# Patient Record
Sex: MALE | Employment: OTHER | URBAN - METROPOLITAN AREA
[De-identification: names, ages, dates, MRNs, and addresses within clinical notes are randomized per-mention and may not be internally consistent; named-entity substitution may affect disease eponyms.]

---

## 2020-09-29 ENCOUNTER — TELEPHONE (OUTPATIENT)
Dept: TRANSPLANT | Facility: CLINIC | Age: 55
End: 2020-09-29

## 2020-09-29 NOTE — TELEPHONE ENCOUNTER
----- Message from Blanca Merritt sent at 9/29/2020  9:54 AM CDT -----    Pending insur cards from ref Md office.   .  ----- Message -----  From: Adele Moreland  Sent: 9/28/2020   5:08 PM CDT  To: [facility] Referral Pool    Good afternoon,    The pt listed above is being referred from Dr. Declan Aldana for liver cancer. I have scanned the referral and records into . Waiting on insurance card  and liver transplant referral form from referring providers office. Please contact pt to schedule appt at your earliest convenience.          Thank You,  Adele Moore

## 2020-09-30 ENCOUNTER — TELEPHONE (OUTPATIENT)
Dept: TRANSPLANT | Facility: CLINIC | Age: 55
End: 2020-09-30

## 2020-09-30 NOTE — TELEPHONE ENCOUNTER
----- Message from Blanca Merritt sent at 9/30/2020 10:50 AM CDT -----    Called ref Md office at [phone] and was able to obtain pt insur info. Pt will also need his CD.  .  ----- Message -----  From: Blanca Merritt  Sent: 9/29/2020   9:54 AM CDT  To: Blanca Merritt      Pending insur cards from ref Md office.   .  ----- Message -----  From: Adele Moreland  Sent: 9/28/2020   5:08 PM CDT  To: [facility] Referral Pool    Good afternoon,    The pt listed above is being referred from Dr. Declan Aldana for liver cancer. I have scanned the referral and records into . Waiting on insurance card  and liver transplant referral form from referring providers office. Please contact pt to schedule appt at your earliest convenience.          Thank You,  Adele Moore

## 2020-10-06 ENCOUNTER — TELEPHONE (OUTPATIENT)
Dept: TRANSPLANT | Facility: CLINIC | Age: 55
End: 2020-10-06

## 2020-10-06 NOTE — TELEPHONE ENCOUNTER
Referral received from Declan Aldana     Patient with cirrhosis with liver mass. MELD 19  ICD-10:  k74.60  Referred for liver transplant for  EVALUATION.    Referral completed and forwarded to [facility].    Requested CD from referring.        Insurance:

## 2020-10-07 ENCOUNTER — TELEPHONE (OUTPATIENT)
Dept: TRANSPLANT | Facility: CLINIC | Age: 55
End: 2020-10-07

## 2020-10-07 NOTE — TELEPHONE ENCOUNTER
Pt called re appt and need for CD's. I spoke to the patient who put me on the phone with his wife and eventually his sister. I discussed his diagnosis of cirrhosis and liver cancer and the need for a liver transplant and treatment. The pt does not want to come to [place] as it is too far.  I gave him the options of [place] and [place]. I discussed with the pt and family that without treatment or a liver transplant he will get sicker and die of liver disease. I encourage them to consider coming to [place]/[facility] as we do not have a sobriety rule. Pt stated he stopped drinking but was told he would not get a transplant unless he was sober for 1 year. I informed him this was not true. SPoke about the evaluation and need to come. He again stated he does not want to come to [place]. Referring Dr BRYAN VEE called, spoke with his nurse, she will give him the information.

## 2023-10-25 ENCOUNTER — TELEPHONE (OUTPATIENT)
Dept: PRIMARY CARE CLINIC | Facility: CLINIC | Age: 58
End: 2023-10-25
Payer: MEDICARE

## 2023-10-25 NOTE — TELEPHONE ENCOUNTER
He used to an oncologist at [facility]; Dr Newton and the cardiologist is Dr Bhandari and Dr Kasper for pulmonology.     ----- Message from Viviana Cruz MD sent at 10/25/2023  5:22 AM CDT -----  Regarding: RE: New Patient  Contact: Patient  I need a bit more information, does he have an oncologist? What other providers is he seeing? Does he have a hepatologist? What is the reason to establish      ----- Message -----  From: Lala George MA  Sent: 10/20/2023   3:03 PM CDT  To: Viviana Cruz MD  Subject: FW: New Patient                                    ----- Message -----  From: Nneka Hamilton  Sent: 10/20/2023   2:48 PM CDT  To: Anthony Michelle Staff  Subject: New Patient                                      Per phone call with patient, he stated that he wanted to get establish with his new primary doctor and would like to schedule an appointment to see the physician.  Please return call at [phone].    Thanks,  SJ

## 2024-06-14 ENCOUNTER — TELEPHONE (OUTPATIENT)
Dept: HEMATOLOGY/ONCOLOGY | Facility: CLINIC | Age: 59
End: 2024-06-14
Payer: MEDICARE

## 2024-06-14 NOTE — TELEPHONE ENCOUNTER
Spoke to the patient. Appointment date, time, and location provided. Direct number given. All questions answered. TTRN

## 2024-06-24 ENCOUNTER — TELEPHONE (OUTPATIENT)
Dept: HEMATOLOGY/ONCOLOGY | Facility: CLINIC | Age: 59
End: 2024-06-24

## 2024-06-24 NOTE — TELEPHONE ENCOUNTER
Patient called to cancel appointment due to family emergency. Appointment rescheduled per patient request. TTRN

## 2024-07-02 ENCOUNTER — TELEPHONE (OUTPATIENT)
Dept: HEMATOLOGY/ONCOLOGY | Facility: CLINIC | Age: 59
End: 2024-07-02
Payer: MEDICARE

## 2024-07-17 ENCOUNTER — TELEPHONE (OUTPATIENT)
Dept: HEMATOLOGY/ONCOLOGY | Facility: CLINIC | Age: 59
End: 2024-07-17
Payer: MEDICARE

## 2024-07-30 ENCOUNTER — TELEPHONE (OUTPATIENT)
Dept: HEMATOLOGY/ONCOLOGY | Facility: CLINIC | Age: 59
End: 2024-07-30
Payer: MEDICARE

## 2024-07-30 NOTE — TELEPHONE ENCOUNTER
Spoke with the patients sister Kamilla appointment date, time, and location provided. All questions answered. TTRN

## 2024-08-13 ENCOUNTER — OFFICE VISIT (OUTPATIENT)
Dept: HEMATOLOGY/ONCOLOGY | Facility: CLINIC | Age: 59
End: 2024-08-13
Payer: MEDICARE

## 2024-08-13 VITALS
OXYGEN SATURATION: 97 % | DIASTOLIC BLOOD PRESSURE: 60 MMHG | HEART RATE: 85 BPM | SYSTOLIC BLOOD PRESSURE: 112 MMHG | HEIGHT: 73 IN | RESPIRATION RATE: 16 BRPM | BODY MASS INDEX: 15.15 KG/M2 | WEIGHT: 114.31 LBS

## 2024-08-13 DIAGNOSIS — C13.9 HYPOPHARYNGEAL CANCER: Primary | ICD-10-CM

## 2024-08-13 PROCEDURE — G0179 MD RECERTIFICATION HHA PT: HCPCS | Mod: ,,, | Performed by: INTERNAL MEDICINE

## 2024-08-13 RX ORDER — CEFDINIR 250 MG/5ML
250 POWDER, FOR SUSPENSION ORAL DAILY
COMMUNITY

## 2024-08-13 RX ORDER — FAMOTIDINE 20 MG/1
20 TABLET, FILM COATED ORAL 2 TIMES DAILY
COMMUNITY

## 2024-08-14 ENCOUNTER — OFFICE VISIT (OUTPATIENT)
Dept: PRIMARY CARE CLINIC | Facility: CLINIC | Age: 59
End: 2024-08-14
Payer: MEDICARE

## 2024-08-14 VITALS
DIASTOLIC BLOOD PRESSURE: 74 MMHG | TEMPERATURE: 98 F | HEIGHT: 73 IN | SYSTOLIC BLOOD PRESSURE: 138 MMHG | OXYGEN SATURATION: 97 % | RESPIRATION RATE: 16 BRPM | HEART RATE: 69 BPM | BODY MASS INDEX: 14.69 KG/M2 | WEIGHT: 110.88 LBS

## 2024-08-14 DIAGNOSIS — E43 SEVERE PROTEIN-CALORIE MALNUTRITION: ICD-10-CM

## 2024-08-14 DIAGNOSIS — I10 ESSENTIAL (PRIMARY) HYPERTENSION: ICD-10-CM

## 2024-08-14 DIAGNOSIS — C32.9 LARYNGEAL CANCER: ICD-10-CM

## 2024-08-14 DIAGNOSIS — G40.909 SEIZURE DISORDER: Primary | ICD-10-CM

## 2024-08-14 DIAGNOSIS — C22.0 HEPATOCELLULAR CARCINOMA: ICD-10-CM

## 2024-08-14 PROBLEM — K70.30 ALCOHOLIC CIRRHOSIS OF LIVER WITHOUT ASCITES: Status: ACTIVE | Noted: 2024-06-04

## 2024-08-14 PROBLEM — Z86.79 HISTORY OF SUBDURAL HEMATOMA: Status: ACTIVE | Noted: 2024-06-04

## 2024-08-14 PROBLEM — F10.91 ALCOHOL USE DISORDER IN REMISSION: Status: ACTIVE | Noted: 2024-06-04

## 2024-08-14 PROBLEM — R13.12 DYSPHAGIA, OROPHARYNGEAL PHASE: Status: ACTIVE | Noted: 2024-06-09

## 2024-08-14 RX ORDER — METOPROLOL TARTRATE 25 MG/1
25 TABLET, FILM COATED ORAL DAILY
COMMUNITY
Start: 2024-06-13

## 2024-08-14 RX ORDER — LEVETIRACETAM 100 MG/ML
1000 SOLUTION ORAL 2 TIMES DAILY
COMMUNITY
Start: 2024-06-13

## 2024-08-14 RX ORDER — SACUBITRIL AND VALSARTAN 24; 26 MG/1; MG/1
1 TABLET, FILM COATED ORAL 2 TIMES DAILY
COMMUNITY
Start: 2024-05-21

## 2024-08-14 NOTE — PROGRESS NOTES
Subjective:      Patient ID: Wallace D Toussaint is a 59 y.o. male.    Chief Complaint: Establish Care (Est care hx Hypopharyngeal cance, states he is currently off of Entresto and Metoprolol due to needing it in liquid form)    HPI:  Patient with history of seizures and is on Keppra.  Patient reports seizures are under control with medication and last seizure was three years ago.  Patient also has history of alcohol dependence, alcoholic liver cirrhosis and quit drinking 4 years ago.  Patient denies any blood in stool, black color stool, abdominal swelling.  Patient has questionable history of hepatocellular carcinoma as well diagnosed 15 years ago and it was not treated.     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  Patient is now being followed by Dr. Gallagher/oncology. (oncology note is not available but family reports that he is planning to go over the results/imaging studies before planning on chemo).      Patient has questionable history of congestive heart failure with last echocardiogram showed an EF of 55 to 65% with normal diastolic dysfunction. There is no evidence of volume overload during the hospital stay. He is not on home diuretics. (as per hospital note from June 4, 2024).  Patient also has questionable history of atrial fibrillation (documented in hospital notes but patient denies the diagnosis)       Review of Systems   Constitutional:  Positive for weight loss (30 lb weight loss in 2 months). Negative for chills, diaphoresis, fever and malaise/fatigue.   HENT:  Negative for congestion, ear pain, sinus pain, sore throat and tinnitus.    Eyes:  Negative for blurred vision and photophobia.   Respiratory:  Positive for cough. Negative for hemoptysis, shortness of breath and wheezing.    Cardiovascular:  Negative for chest pain, palpitations, orthopnea, leg swelling and PND.   Gastrointestinal:  Negative for abdominal  "pain, blood in stool, constipation, diarrhea, heartburn, melena, nausea and vomiting.   Genitourinary:  Negative for dysuria, frequency and urgency.   Musculoskeletal:  Negative for back pain, myalgias and neck pain.   Skin:  Negative for rash.   Neurological:  Negative for dizziness, tremors, seizures, loss of consciousness and weakness.   Endo/Heme/Allergies:  Negative for polydipsia.   Psychiatric/Behavioral:  Negative for depression and hallucinations. The patient does not have insomnia.      Objective:   /74 (BP Location: Right arm, Patient Position: Sitting, BP Method: Small (Manual))   Pulse 69   Temp 97.7 °F (36.5 °C) (Oral)   Resp 16   Ht 6' 1" (1.854 m)   Wt 50.3 kg (110 lb 14.4 oz)   SpO2 97%   BMI 14.63 kg/m²     Physical Exam  Constitutional:       General: He is not in acute distress.     Appearance: He is ill-appearing. He is not diaphoretic.      Comments: Cachectic   Neck:      Thyroid: No thyromegaly.      Comments: Large 2 x 4 in mass on the right side of the neck.   Mass is hard, nonpulsatile, without any erythema warmth or tenderness  Another large mass measuring 2 x 4 inches on left neck just below sub mandible  Mass is hard, nonpulsatile without any erythema warmth or tenderness  Cardiovascular:      Rate and Rhythm: Normal rate and regular rhythm.      Heart sounds: Normal heart sounds. No murmur heard.  Pulmonary:      Effort: Pulmonary effort is normal. No respiratory distress.      Breath sounds: Normal breath sounds. No wheezing.      Comments: Tracheostomy tube is placed.  Patient is not able to speak  Abdominal:      General: Bowel sounds are normal. There is no distension.      Palpations: Abdomen is soft.      Tenderness: There is no abdominal tenderness.      Comments: PEG tube is placed and insertion site has no erythema warmth or oozing   Lymphadenopathy:      Cervical: No cervical adenopathy.   Neurological:      Mental Status: He is alert and oriented to person, " place, and time.   Psychiatric:         Behavior: Behavior normal.         Thought Content: Thought content normal.         Judgment: Judgment normal.       Assessment:       ICD-10-CM ICD-9-CM   1. Seizure disorder  G40.909 345.90   2. Essential (primary) hypertension  I10 401.9   3. Hepatocellular carcinoma  C22.0 155.0   4. Severe protein-calorie malnutrition  E43 262   5. Laryngeal cancer  C32.9 161.9       Plan:     Patient with multiple medical problem including recently diagnosed laryngeal cancer  Patient is being followed by hematologist  Advised patient to keep appointments  Patient has PEG tube placed for nutrition as he is having difficulty swallowing  Will refer to nutritionist for calorie count and supplementation  Patient has history of hepatocellular carcinoma no treatment was done according to patient  Will repeat ultrasound abdomen at some point  Patient has congestive heart failure documented as a diagnosis  But echocardiogram recently showed normal EF and normal diastolic function  Patient blood pressure seem to be under okay control the patient is off of medication  Keeping in mind significant weight loss after diagnosis of laryngeal cancer which may have played a role in low blood pressure  Will not start blood pressure medication  Patient requests referral to home health..  Will refer    Medication List with Changes/Refills   Current Medications    CEFDINIR (OMNICEF) 250 MG/5 ML SUSPENSION    Take 250 mg by mouth once daily. Take 250mg/5mg by mouth daily    ENTRESTO 24-26 MG PER TABLET    Take 1 tablet by mouth 2 (two) times daily.    FAMOTIDINE (PEPCID) 20 MG TABLET    Take 20 mg by mouth 2 (two) times daily.    LEVETIRACETAM (KEPPRA) 100 MG/ML SOLN    1,000 mg by FEEDING TUBE route 2 (two) times daily.    METOPROLOL TARTRATE (LOPRESSOR) 25 MG TABLET    25 mg by FEEDING TUBE route once daily.

## 2024-08-15 ENCOUNTER — TELEPHONE (OUTPATIENT)
Dept: HEMATOLOGY/ONCOLOGY | Facility: CLINIC | Age: 59
End: 2024-08-15
Payer: MEDICARE

## 2024-08-15 NOTE — TELEPHONE ENCOUNTER
Spoke to the patients brother Pierce. Discussed patients upcoming appointment and next steps in care. Pierce states that the patients sister is calling the dentist today to set up. Once done he states he will call me with the date. I discussed the next steps in care and reiterated the importance of keeping all appointments. States understanding. Direct number provided for any needs. All questions answered. TTRN

## 2024-08-15 NOTE — TELEPHONE ENCOUNTER
----- Message from Enrrique Cavanaugh RN sent at 8/15/2024  2:49 PM CDT -----  Regarding: FW: Surgical Clearance  Contact: Meaghan,Sister  Are you familiar with this?  ----- Message -----  From: Nneka Hamilton  Sent: 8/15/2024   2:42 PM CDT  To: Elliott Lara Staff  Subject: Surgical Clearance                               Per phone call with Meaghan, she stated that she contacted [facility] and they are not in his network and she did not know if Miss Can contacted [facility] or not and this is what she was checking on because he did a surgical clearance.  Please return call at at [phone].    Thanks,  SJ

## 2024-08-15 NOTE — TELEPHONE ENCOUNTER
Spoke with the patients sister Meaghan. Patient set up with Dentist 8/16/24 at 8am with Dr Faith.

## 2024-08-16 ENCOUNTER — TELEPHONE (OUTPATIENT)
Dept: PRIMARY CARE CLINIC | Facility: CLINIC | Age: 59
End: 2024-08-16
Payer: MEDICARE

## 2024-08-16 ENCOUNTER — TELEPHONE (OUTPATIENT)
Dept: HEMATOLOGY/ONCOLOGY | Facility: CLINIC | Age: 59
End: 2024-08-16
Payer: MEDICARE

## 2024-08-16 NOTE — TELEPHONE ENCOUNTER
Per Rena at [facility] Mr. Toussaint had 12 teeth extracted 8/16/24 with Dr. Howell. He will be cleared for XRT on 8/30/24. TTRN

## 2024-08-16 NOTE — TELEPHONE ENCOUNTER
----- Message from Nneka Hamilton sent at 8/16/2024  9:09 AM CDT -----  Regarding: Home Health Agency  Contact: Shikha, [facility]  Per phone call with Shikha, she stated that the patient has a Home Health Agency that he goes to.  Please return call at [phone].    Thanks,  SJ

## 2024-08-17 PROBLEM — C13.9 HYPOPHARYNGEAL CANCER: Status: ACTIVE | Noted: 2024-08-17

## 2024-08-17 NOTE — PROGRESS NOTES
MEDICAL ONCOLOGY INITIAL CONSULTATION NOTE    Patient ID: Wallace D Toussaint is a 59 y.o. male.    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  He has trach in place and has been evaluated recently by radiation oncology for concurrent chemoradiation.  He presents to our clinic today for further evaluation.  He has yet to see the dentist for dental clearance prior to starting radiation treatment.  He is accompanied by his brother Pierce as patient no showed previously on 3 different occasions.         Imaging:     PET scan:   No evidence of distant metastatic disease    Past Medical History:   Diagnosis Date    Alcoholic cirrhosis of liver without ascites 06/04/2024    Last Assessment & Plan:    Formatting of this note might be different from the original.   History & Physical patient reports history of cirrhosis currently appears to be on lactulose.  Complicated by possible hepatocellular carcinoma although patient thinks this was a misdiagnosis.  Currently compensated   -Will resume lactulose when able to swallow      Discharge Summary       Follow-up      Cancer of hypopharynx     Patient was diagnosed in June but not sure of the exact date.    Essential (primary) hypertension 06/04/2024    Last Assessment & Plan:    Formatting of this note might be different from the original.   History & Physical well-controlled on admission at 125/65.   Patient on Entresto and metoprolol which we will hold for now until cleared by speech      Discharge Summary blood pressure was well-controlled despite holding his home medications.  Will continue to hold upon discharge and can follow-up with PCP f    History of subdural hematoma 06/04/2024    Last Assessment &  Plan:    Formatting of this note might be different from the original.   History & Physical per chart review.  Patient did not mention this in history      Discharge Summary       Follow-up      Liver cancer     Patient is not sure of the exact date he was diagnosed with this cancer.    Seizure disorder 2024    Last Assessment & Plan:    Formatting of this note might be different from the original.   History & Physical patient reports no seizure in the past 3 years.  Also does not follow with neurology.  Has been on Keppra prescribed by PCP.   -Will continue IV Keppra for now and transition to p.o. when able      Discharge Summary no seizure activity during the hospital stay.  Keppra resumed upon dischar     Family History   Problem Relation Name Age of Onset    Cancer Mother      Stroke Father      Hypertension Father      Hypertension Brother Patrick Toussaint      Social History     Socioeconomic History    Marital status:     Number of children: 0   Tobacco Use    Smoking status: Former     Current packs/day: 0.00     Average packs/day: 1 pack/day for 40.0 years (40.0 ttl pk-yrs)     Types: Cigarettes     Start date:      Quit date: 2020     Years since quittin.6    Smokeless tobacco: Former   Substance and Sexual Activity    Alcohol use: Not Currently    Drug use: Not Currently     Types: Marijuana     Social Determinants of Health     Food Insecurity: No Food Insecurity (2024)    Received from [employer] [facility]sidiaries [facility] Affiliates    Hunger Vital Sign     Worried About Running Out of Food in the Last Year: Never true     Ran Out of Food in the Last Year: Never true   Transportation Needs: No Transportation Needs (2024)    Received from [employer] [facility]    PRAPARE - Transportation     Lack of Transportation (Medical): No     Lack of Transportation (Non-Medical): No      Past Surgical History:   Procedure Laterality Date    APPENDECTOMY      Patient is not sure of the exact date but this was done when he was a kid.    TRACHEOTOMY  06/2024    Patient is not sure of the exact date but it was done in june 2024         Review of systems:  Review of Systems   Constitutional:  Positive for activity change. Negative for appetite change, chills, diaphoresis, fatigue and unexpected weight change.   HENT:  Positive for trouble swallowing and voice change. Negative for congestion, facial swelling, hearing loss and mouth sores.    Eyes:  Negative for photophobia, pain, discharge and itching.   Respiratory:  Negative for apnea, cough, choking, chest tightness and shortness of breath.    Cardiovascular:  Negative for chest pain, palpitations and leg swelling.   Gastrointestinal:  Negative for abdominal distention, abdominal pain, anal bleeding and blood in stool.   Endocrine: Negative for cold intolerance, heat intolerance, polydipsia and polyphagia.   Genitourinary:  Negative for difficulty urinating, dysuria, flank pain and hematuria.   Musculoskeletal:  Negative for arthralgias, back pain, joint swelling, myalgias, neck pain and neck stiffness.   Skin:  Negative for color change, pallor and wound.   Allergic/Immunologic: Negative for environmental allergies, food allergies and immunocompromised state.   Neurological:  Negative for dizziness, seizures, facial asymmetry, speech difficulty, light-headedness, numbness and headaches.   Hematological:  Negative for adenopathy. Does not bruise/bleed easily.   Psychiatric/Behavioral:  Negative for agitation, behavioral problems, confusion, decreased concentration and sleep disturbance.               Physical Exam  Vitals and nursing note reviewed.   Constitutional:       General: He is not in acute distress.     Appearance: Normal appearance. He is not ill-appearing.   HENT:      Head: Normocephalic and atraumatic.      Nose: No congestion or  rhinorrhea.   Eyes:      General: No scleral icterus.     Extraocular Movements: Extraocular movements intact.      Pupils: Pupils are equal, round, and reactive to light.   Neck:      Comments: +ve for trach in place  Cardiovascular:      Rate and Rhythm: Normal rate and regular rhythm.      Pulses: Normal pulses.      Heart sounds: Normal heart sounds. No murmur heard.     No gallop.   Pulmonary:      Effort: Pulmonary effort is normal. No respiratory distress.      Breath sounds: Normal breath sounds. No stridor. No wheezing or rhonchi.   Abdominal:      General: Bowel sounds are normal. There is no distension.      Palpations: There is no mass.      Tenderness: There is no abdominal tenderness. There is no guarding.   Musculoskeletal:         General: No swelling, tenderness, deformity or signs of injury. Normal range of motion.      Cervical back: Normal range of motion and neck supple. No rigidity. No muscular tenderness.      Right lower leg: No edema.      Left lower leg: No edema.   Skin:     General: Skin is warm.      Coloration: Skin is not jaundiced or pale.      Findings: No bruising or lesion.   Neurological:      General: No focal deficit present.      Mental Status: He is alert and oriented to person, place, and time.      Cranial Nerves: No cranial nerve deficit.      Sensory: No sensory deficit.      Motor: No weakness.      Gait: Gait normal.   Psychiatric:         Mood and Affect: Mood normal.         Behavior: Behavior normal.         Thought Content: Thought content normal.                   Vitals:    08/13/24 1519   BP: 112/60   Pulse: 85   Resp: 16   Body surface area is 1.63 meters squared.    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 P16 negative, PDL1 4%  == PET scan showed no evidence of distant metastatic disease.   == Recommendation per NCCN guidelines would be for high dose concurrent chemo-XRT. I will send PA for  port placement and chemotherapy with weekly Cisplatin 40 mg/m2 to be given with radiation.      2. H/O Hepatocellular Carcinoma:    == Reported by patient and on his notes from PCP  == Do not have any prior treatment records at this time. He reports being followed in [place]. Will obtain those treatment records from 2020      Plan:  Port placement  PA for cisplatin for concurrent chemo-XRT      RTC in 1 week for NP visit for follow up and chemo education       Future Appointments   Date Time Provider Department Center   8/20/2024  2:00 PM Rvea Goirdano, NP [facility]    10/16/2024  9:45 AM Daniel Resendiz MD [facility]          Total time spent in counseling and discussion about further management options including relevant lab work, treatment,  prognosis, medications and intended side effects was more than 60 minutes. More than 50% of the time was spent on counseling and coordination of care.  I spent a total of 60 minutes on the day of the visit.This includes face to face time and non-face to face time preparing to see the patient (eg, review of tests), Obtaining and/or reviewing separately obtained history, Documenting clinical information in the electronic or other health record, Independently interpreting resultsand communicating results to the patient/family/caregiver, or Care coordination.

## 2024-08-20 ENCOUNTER — TELEPHONE (OUTPATIENT)
Dept: PRIMARY CARE CLINIC | Facility: CLINIC | Age: 59
End: 2024-08-20
Payer: MEDICARE

## 2024-08-20 ENCOUNTER — CLINICAL SUPPORT (OUTPATIENT)
Dept: HEMATOLOGY/ONCOLOGY | Facility: CLINIC | Age: 59
End: 2024-08-20
Payer: MEDICARE

## 2024-08-20 VITALS
SYSTOLIC BLOOD PRESSURE: 141 MMHG | DIASTOLIC BLOOD PRESSURE: 72 MMHG | TEMPERATURE: 99 F | WEIGHT: 114.69 LBS | OXYGEN SATURATION: 94 % | HEART RATE: 91 BPM | RESPIRATION RATE: 16 BRPM | BODY MASS INDEX: 15.2 KG/M2 | HEIGHT: 73 IN

## 2024-08-20 DIAGNOSIS — K59.00 CONSTIPATION, UNSPECIFIED CONSTIPATION TYPE: Primary | ICD-10-CM

## 2024-08-20 DIAGNOSIS — K59.00 CONSTIPATION, UNSPECIFIED CONSTIPATION TYPE: ICD-10-CM

## 2024-08-20 DIAGNOSIS — C13.9 HYPOPHARYNGEAL CANCER: Primary | ICD-10-CM

## 2024-08-20 DIAGNOSIS — I10 HYPERTENSION, UNSPECIFIED TYPE: ICD-10-CM

## 2024-08-20 DIAGNOSIS — I10 ESSENTIAL (PRIMARY) HYPERTENSION: ICD-10-CM

## 2024-08-20 RX ORDER — LACTULOSE 10 G/15ML
10 SOLUTION ORAL DAILY
Qty: 30 ML | Refills: 1 | Status: SHIPPED | OUTPATIENT
Start: 2024-08-20

## 2024-08-20 RX ORDER — LACTULOSE 10 G/15ML
10 SOLUTION ORAL DAILY
Qty: 30 ML | Refills: 1 | Status: SHIPPED | OUTPATIENT
Start: 2024-08-20 | End: 2024-08-20 | Stop reason: SDUPTHER

## 2024-08-20 RX ORDER — ONDANSETRON HYDROCHLORIDE 8 MG/1
8 TABLET, FILM COATED ORAL EVERY 8 HOURS PRN
Qty: 30 TABLET | Refills: 2 | Status: SHIPPED | OUTPATIENT
Start: 2024-08-20 | End: 2025-08-20

## 2024-08-20 NOTE — PROGRESS NOTES
MEDICAL ONCOLOGY INITIAL CONSULTATION NOTE    Patient ID: Wallace D Toussaint is a 59 y.o. male.    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  He has trach in place and has been evaluated recently by radiation oncology for concurrent chemoradiation.  He presents to our clinic today for further evaluation.  He has yet to see the dentist for dental clearance prior to starting radiation treatment.  He is accompanied by his brother Pierce as patient no showed previously on 3 different occasions.         Imaging:     PET scan:   No evidence of distant metastatic disease    Past Medical History:   Diagnosis Date    Alcoholic cirrhosis of liver without ascites 06/04/2024    Last Assessment & Plan:    Formatting of this note might be different from the original.   History & Physical patient reports history of cirrhosis currently appears to be on lactulose.  Complicated by possible hepatocellular carcinoma although patient thinks this was a misdiagnosis.  Currently compensated   -Will resume lactulose when able to swallow      Discharge Summary       Follow-up      Cancer of hypopharynx     Patient was diagnosed in June but not sure of the exact date.    Essential (primary) hypertension 06/04/2024    Last Assessment & Plan:    Formatting of this note might be different from the original.   History & Physical well-controlled on admission at 125/65.   Patient on Entresto and metoprolol which we will hold for now until cleared by speech      Discharge Summary blood pressure was well-controlled despite holding his home medications.  Will continue to hold upon discharge and can follow-up with PCP f    History of subdural hematoma 06/04/2024    Last Assessment &  Plan:    Formatting of this note might be different from the original.   History & Physical per chart review.  Patient did not mention this in history      Discharge Summary       Follow-up      Liver cancer     Patient is not sure of the exact date he was diagnosed with this cancer.    Seizure disorder 2024    Last Assessment & Plan:    Formatting of this note might be different from the original.   History & Physical patient reports no seizure in the past 3 years.  Also does not follow with neurology.  Has been on Keppra prescribed by PCP.   -Will continue IV Keppra for now and transition to p.o. when able      Discharge Summary no seizure activity during the hospital stay.  Keppra resumed upon dischar     Family History   Problem Relation Name Age of Onset    Cancer Mother      Stroke Father      Hypertension Father      Hypertension Brother Patrick Toussaint      Social History     Socioeconomic History    Marital status:     Number of children: 0   Tobacco Use    Smoking status: Former     Current packs/day: 0.00     Average packs/day: 1 pack/day for 40.0 years (40.0 ttl pk-yrs)     Types: Cigarettes     Start date:      Quit date: 2020     Years since quittin.6    Smokeless tobacco: Former   Substance and Sexual Activity    Alcohol use: Not Currently    Drug use: Not Currently     Types: Marijuana     Social Determinants of Health     Food Insecurity: No Food Insecurity (2024)    Received from [employer] [employer] [facility] and Its Subsidiaries and Affiliates    Hunger Vital Sign     Worried About Running Out of Food in the Last Year: Never true     Ran Out of Food in the Last Year: Never true   Transportation Needs: No Transportation Needs (2024)    Received from [facility] [facility] Subsidiaries and Affiliates    PRAPARE - Transportation     Lack of Transportation (Medical): No     Lack of Transportation (Non-Medical): No      Past Surgical History:   Procedure Laterality Date    APPENDECTOMY      Patient is not sure of the exact date but this was done when he was a kid.    TRACHEOTOMY  06/2024    Patient is not sure of the exact date but it was done in june 2024         Review of systems:  Review of Systems   Constitutional:  Positive for activity change. Negative for appetite change, chills, diaphoresis, fatigue and unexpected weight change.   HENT:  Positive for trouble swallowing and voice change. Negative for congestion, facial swelling, hearing loss and mouth sores.    Eyes:  Negative for photophobia, pain, discharge and itching.   Respiratory:  Negative for apnea, cough, choking, chest tightness and shortness of breath.    Cardiovascular:  Negative for chest pain, palpitations and leg swelling.   Gastrointestinal:  Negative for abdominal distention, abdominal pain, anal bleeding and blood in stool.   Endocrine: Negative for cold intolerance, heat intolerance, polydipsia and polyphagia.   Genitourinary:  Negative for difficulty urinating, dysuria, flank pain and hematuria.   Musculoskeletal:  Negative for arthralgias, back pain, joint swelling, myalgias, neck pain and neck stiffness.   Skin:  Negative for color change, pallor and wound.   Allergic/Immunologic: Negative for environmental allergies, food allergies and immunocompromised state.   Neurological:  Negative for dizziness, seizures, facial asymmetry, speech difficulty, light-headedness, numbness and headaches.   Hematological:  Negative for adenopathy. Does not bruise/bleed easily.   Psychiatric/Behavioral:  Negative for agitation, behavioral problems, confusion, decreased concentration and sleep disturbance.               Physical Exam  Vitals and nursing note reviewed.   Constitutional:       General: He is not in acute distress.     Appearance: Normal appearance. He is not ill-appearing.   HENT:      Head: Normocephalic and atraumatic.      Nose: No congestion or  rhinorrhea.   Eyes:      General: No scleral icterus.     Extraocular Movements: Extraocular movements intact.      Pupils: Pupils are equal, round, and reactive to light.   Neck:      Comments: +ve for trach in place  Cardiovascular:      Rate and Rhythm: Normal rate and regular rhythm.      Pulses: Normal pulses.      Heart sounds: Normal heart sounds. No murmur heard.     No gallop.   Pulmonary:      Effort: Pulmonary effort is normal. No respiratory distress.      Breath sounds: Normal breath sounds. No stridor. No wheezing or rhonchi.   Abdominal:      General: Bowel sounds are normal. There is no distension.      Palpations: There is no mass.      Tenderness: There is no abdominal tenderness. There is no guarding.   Musculoskeletal:         General: No swelling, tenderness, deformity or signs of injury. Normal range of motion.      Cervical back: Normal range of motion and neck supple. No rigidity. No muscular tenderness.      Right lower leg: No edema.      Left lower leg: No edema.   Skin:     General: Skin is warm.      Coloration: Skin is not jaundiced or pale.      Findings: No bruising or lesion.   Neurological:      General: No focal deficit present.      Mental Status: He is alert and oriented to person, place, and time.      Cranial Nerves: No cranial nerve deficit.      Sensory: No sensory deficit.      Motor: No weakness.      Gait: Gait normal.   Psychiatric:         Mood and Affect: Mood normal.         Behavior: Behavior normal.         Thought Content: Thought content normal.                 There were no vitals filed for this visit.  There is no height or weight on file to calculate BSA.    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 P16 negative, PDL1 4%  == PET scan showed no evidence of distant metastatic disease.   == Recommendation per NCCN guidelines would be for high dose concurrent chemo-XRT. I will send PA for port  placement and chemotherapy with weekly Cisplatin 40 mg/m2 to be given with radiation.  ==08/20/2024: audiogram ordered for cisplatin, pt had teeth removed and peg has been placed.  Patient here today to discuss upcoming chemotherapy/immunotherapy. An extensive discussion was had which included a thorough discussion of potential side effect profile, risk versus benefits, and expected outcomes.  Risks, including but not limited to, possible hair loss, bone marrow damage, damage to body organs, allergic reactions, sterility, nausea/vomiting, constipation/diarrhea, sores in the mouth, secondary cancers, and rarely death were all discuss.  Specific side effects pertaining to their chemotherapy/immunotherapy medications were discussed as well.  The patient agrees with the plan and all questions and their support systems questions have been answered to their satisfaction.  Premedications were prescribed and patient was educated on appropriate usage.  Patient was educated on when to call, and given the number to call, or to notify the provider including but not limited to:  Persistent nausea and vomiting, dehydration, fever greater than 100.4 lasting over 1 hour induration or isolated feeders greater than 101, rash while on active chemotherapy or immunotherapy, severe pain or new onset pain not controlled by current pain medication regimen.      2. H/O Hepatocellular Carcinoma:    == Reported by patient and on his notes from PCP  == Do not have any prior treatment records at this time. He reports being followed in [place]. Will obtain those treatment records from 2020    3. Hypertension  ==continue to follow up with pcp      Plan:  RTC on 9/4/24 start chemotherapy cbc cmp prior  Port placement - appt with Dr. Giang  Plan to start concurrnt chemoradiation on 9/4/24   Audiogram ordered  PA for cisplatin for concurrent chemo-XRT - pending        Future Appointments   Date Time Provider Department Center   8/20/2024  2:00 PM  Reva Giordano NP [facility] [address]   10/16/2024  9:45 AM Daniel Resendiz MD [facility] [address]          Total time spent in counseling and discussion about further management options including relevant lab work, treatment,  prognosis, medications and intended side effects was more than 60 minutes. More than 50% of the time was spent on counseling and coordination of care.  I spent a total of 60 minutes on the day of the visit.This includes face to face time and non-face to face time preparing to see the patient (eg, review of tests), Obtaining and/or reviewing separately obtained history, Documenting clinical information in the electronic or other health record, Independently interpreting resultsand communicating results to the patient/family/caregiver, or Care coordination.

## 2024-08-20 NOTE — TELEPHONE ENCOUNTER
Please make sure patient is eating and has enough fiber in his diet to make stool. Patient CT abdomen 2 months ago suggest liver cirrhosis as well.  Will use lactulose to have loose bowel movement.  Please make an early appointment for evaluation

## 2024-08-20 NOTE — TELEPHONE ENCOUNTER
----- Message from Nneka Hamilton sent at 8/20/2024  9:41 AM CDT -----  Regarding: Medication  Contact: Pierce, brother  Per phone call with  Pierce, he stated that his brother is having trouble with his bowel movement and would like medication Lacalose to be called out to the pharmacy.  Please return call at [phone].    Thanks,  SJ

## 2024-08-20 NOTE — TELEPHONE ENCOUNTER
Returned call to patient's brother, Pierce and he states patient requested a script be sent for Lactulose due to constipation. Patient was previously prescribed this medication and Mr. Pelayo was not able to provide any other details. Patient has laryngeal cancer and is unable to speak with me over the phone.

## 2024-08-20 NOTE — TELEPHONE ENCOUNTER
----- Message from Rupa Petty sent at 8/20/2024  2:25 PM CDT -----  Contact: kaylan  lactulose (CHRONULAC) 20 gram/30 mL Soln  Please call pharmacy  [id number] - [place], [place] - [address] AT [facility] [address]  [place] [address]  Phone: [phone] Fax: [phone]

## 2024-08-20 NOTE — TELEPHONE ENCOUNTER
Patient needs sooner f/u for eval constipation per Dr. Resendiz.   Patient's brother Pierce notified.

## 2024-08-21 ENCOUNTER — TELEPHONE (OUTPATIENT)
Dept: HEMATOLOGY/ONCOLOGY | Facility: CLINIC | Age: 59
End: 2024-08-21
Payer: MEDICARE

## 2024-08-23 ENCOUNTER — TELEPHONE (OUTPATIENT)
Dept: HEMATOLOGY/ONCOLOGY | Facility: CLINIC | Age: 59
End: 2024-08-23
Payer: MEDICARE

## 2024-08-26 ENCOUNTER — OFFICE VISIT (OUTPATIENT)
Dept: SURGERY | Facility: CLINIC | Age: 59
End: 2024-08-26
Payer: MEDICARE

## 2024-08-26 DIAGNOSIS — C13.9 HYPOPHARYNGEAL CANCER: Primary | ICD-10-CM

## 2024-08-26 NOTE — PROGRESS NOTES
History & Physical    SUBJECTIVE:     History of Present Illness:    59-year-old male is referred for venous access port by Medical Oncology.  He has hypopharyngeal cancer and needs venous access port for chemotherapy.  He does have a history of congestive heart failure in his last ejection fraction was around 60%.  Patient also has tracheostomy.    Chief Complaint   Patient presents with    Cancer     Port placement           Review of patient's allergies indicates:  Review of patient's allergies indicates:  No Known Allergies    Current Outpatient Medications on File Prior to Visit   Medication Sig Dispense Refill    cefdinir (OMNICEF) 250 mg/5 mL suspension Take 250 mg by mouth once daily. Take 250mg/5mg by mouth daily      ENTRESTO 24-26 mg per tablet Take 1 tablet by mouth 2 (two) times daily.      famotidine (PEPCID) 20 MG tablet Take 20 mg by mouth 2 (two) times daily.      lactulose (CHRONULAC) 20 gram/30 mL Soln 15 mLs (10 g total) by Per G Tube route once daily. 30 mL 1    levETIRAcetam (KEPPRA) 100 mg/mL Soln 1,000 mg by FEEDING TUBE route 2 (two) times daily.      metoprolol tartrate (LOPRESSOR) 25 MG tablet 25 mg by FEEDING TUBE route once daily.      ondansetron (ZOFRAN) 8 MG tablet Take 1 tablet (8 mg total) by mouth every 8 (eight) hours as needed for Nausea. 30 tablet 2     No current facility-administered medications on file prior to visit.       Past Medical History:   Diagnosis Date    Alcoholic cirrhosis of liver without ascites 06/04/2024    Last Assessment & Plan:    Formatting of this note might be different from the original.   History & Physical patient reports history of cirrhosis currently appears to be on lactulose.  Complicated by possible hepatocellular carcinoma although patient thinks this was a misdiagnosis.  Currently compensated   -Will resume lactulose when able to swallow      Discharge Summary       Follow-up      Cancer of hypopharynx     Patient was diagnosed in June but not  sure of the exact date.    Essential (primary) hypertension 06/04/2024    Last Assessment & Plan:    Formatting of this note might be different from the original.   History & Physical well-controlled on admission at 125/65.   Patient on Entresto and metoprolol which we will hold for now until cleared by speech      Discharge Summary blood pressure was well-controlled despite holding his home medications.  Will continue to hold upon discharge and can follow-up with PCP f    History of subdural hematoma 06/04/2024    Last Assessment & Plan:    Formatting of this note might be different from the original.   History & Physical per chart review.  Patient did not mention this in history      Discharge Summary       Follow-up      Liver cancer     Patient is not sure of the exact date he was diagnosed with this cancer.    Seizure disorder 06/04/2024    Last Assessment & Plan:    Formatting of this note might be different from the original.   History & Physical patient reports no seizure in the past 3 years.  Also does not follow with neurology.  Has been on Keppra prescribed by PCP.   -Will continue IV Keppra for now and transition to p.o. when able      Discharge Summary no seizure activity during the hospital stay.  Keppra resumed upon dischar     Past Surgical History:   Procedure Laterality Date    APPENDECTOMY      Patient is not sure of the exact date but this was done when he was a kid.    TRACHEOTOMY  06/2024    Patient is not sure of the exact date but it was done in june 2024     Family History   Problem Relation Name Age of Onset    Cancer Mother      Stroke Father      Hypertension Father      Hypertension Brother Pierce Toussaint        Social History     Socioeconomic History    Marital status:     Number of children: 0   Tobacco Use    Smoking status: Former     Current packs/day: 0.00     Average packs/day: 1 pack/day for 40.0 years (40.0 ttl pk-yrs)     Types: Cigarettes     Start date: 1980      Quit date:      Years since quittin.6    Smokeless tobacco: Former   Substance and Sexual Activity    Alcohol use: Not Currently    Drug use: Not Currently     Types: Marijuana     Social Determinants of Health     Food Insecurity: No Food Insecurity (2024)    Received from [facility] and Its Subsidiaries and Affiliates    Hunger Vital Sign     Worried About Running Out of Food in the Last Year: Never true     Ran Out of Food in the Last Year: Never true   Transportation Needs: No Transportation Needs (2024)    Received from [facility] and Its Subsidiaries and Affiliates    PRAPARE - Transportation     Lack of Transportation (Medical): No     Lack of Transportation (Non-Medical): No          Review of Systems   Constitutional:  Positive for malaise/fatigue and weight loss.   Respiratory:  Positive for sputum production and shortness of breath.    Cardiovascular: Negative.    Gastrointestinal: Negative.    Genitourinary: Negative.    Musculoskeletal:  Positive for joint pain.   Neurological:  Positive for weakness.   Endo/Heme/Allergies:  Bruises/bleeds easily.   Psychiatric/Behavioral: Negative.         OBJECTIVE:     There were no vitals filed for this visit.              Physical Exam:  Physical Exam  Constitutional:       Appearance: He is ill-appearing.   HENT:      Head: Normocephalic and atraumatic.   Cardiovascular:      Rate and Rhythm: Normal rate and regular rhythm.   Pulmonary:      Effort: Pulmonary effort is normal.      Breath sounds: Normal breath sounds.   Abdominal:      General: Abdomen is flat. Bowel sounds are normal. There is distension.      Palpations: Abdomen is soft.   Musculoskeletal:         General: Normal range of motion.      Cervical back: Normal range of motion.   Skin:     General: Skin is warm and dry.      Coloration: Skin is not jaundiced.   Neurological:      General: No focal deficit  present.      Mental Status: He is alert and oriented to person, place, and time.   Psychiatric:         Mood and Affect: Mood normal.         Behavior: Behavior normal.             ASSESSMENT/PLAN:   Advanced hypopharyngeal cancer, needs venous access port for chemotherapy  PLAN:  Discussed with patient venous access port insertion including the risk and benefits.  Surgery scheduled September 3, 2024.  Likely will be done under local sedation

## 2024-08-30 LAB
ANION GAP SERPL CALC-SCNC: 6 MMOL/L (ref 3–11)
APTT PPP: 35.8 SEC (ref 25.2–38.7)
BASOPHILS NFR BLD: 0.2 % (ref 0–3)
BUN SERPL-MCNC: 34 MG/DL (ref 7–18)
BUN/CREAT SERPL: 47.22 RATIO (ref 7–18)
CALCIUM SERPL-MCNC: 10.5 MG/DL (ref 8.8–10.5)
CHLORIDE SERPL-SCNC: 102 MMOL/L (ref 100–108)
CO2 SERPL-SCNC: 29 MMOL/L (ref 21–32)
CREAT SERPL-MCNC: 0.72 MG/DL (ref 0.7–1.3)
EOSINOPHIL NFR BLD: 0.9 % (ref 1–3)
ERYTHROCYTE [DISTWIDTH] IN BLOOD BY AUTOMATED COUNT: 14.5 % (ref 12.5–18)
GFR ESTIMATION: > 60
GLUCOSE SERPL-MCNC: 143 MG/DL (ref 70–110)
HCT VFR BLD AUTO: 35.5 % (ref 42–52)
HGB BLD-MCNC: 11.6 G/DL (ref 14–18)
INR PPP: 1.1 INR (ref 0.9–1.1)
LYMPHOCYTES NFR BLD: 13.2 % (ref 25–40)
MCH RBC QN AUTO: 28.4 PG (ref 27–31.2)
MCHC RBC AUTO-ENTMCNC: 32.7 G/DL (ref 31.8–35.4)
MCV RBC AUTO: 86.8 FL (ref 80–97)
MONOCYTES NFR BLD: 10 % (ref 1–15)
NEUTROPHILS # BLD AUTO: 6.7 10*3/UL (ref 1.8–7.7)
NEUTROPHILS NFR BLD: 75.5 % (ref 37–80)
NUCLEATED RED BLOOD CELLS: 0 %
PLATELETS: 149 10*3/UL (ref 142–424)
POTASSIUM SERPL-SCNC: 3.5 MMOL/L (ref 3.6–5.2)
PROTHROMBIN TIME: 13.2 SEC (ref 10.4–13.2)
RBC # BLD AUTO: 4.09 10*6/UL (ref 4.7–6.1)
SODIUM BLD-SCNC: 137 MMOL/L (ref 135–145)
WBC # BLD: 8.9 10*3/UL (ref 4.6–10.2)

## 2024-09-03 ENCOUNTER — OUTSIDE PLACE OF SERVICE (OUTPATIENT)
Dept: SURGERY | Facility: CLINIC | Age: 59
End: 2024-09-03

## 2024-09-09 ENCOUNTER — OFFICE VISIT (OUTPATIENT)
Dept: HEMATOLOGY/ONCOLOGY | Facility: CLINIC | Age: 59
End: 2024-09-09
Payer: MEDICARE

## 2024-09-09 VITALS
RESPIRATION RATE: 16 BRPM | TEMPERATURE: 98 F | HEART RATE: 107 BPM | BODY MASS INDEX: 14.95 KG/M2 | OXYGEN SATURATION: 100 % | HEIGHT: 73 IN | DIASTOLIC BLOOD PRESSURE: 52 MMHG | WEIGHT: 112.81 LBS | SYSTOLIC BLOOD PRESSURE: 104 MMHG

## 2024-09-09 DIAGNOSIS — C13.9 HYPOPHARYNGEAL CANCER: Primary | ICD-10-CM

## 2024-09-09 DIAGNOSIS — I10 ESSENTIAL (PRIMARY) HYPERTENSION: ICD-10-CM

## 2024-09-09 DIAGNOSIS — K59.00 CONSTIPATION, UNSPECIFIED CONSTIPATION TYPE: ICD-10-CM

## 2024-09-09 LAB
ALBUMIN SERPL BCP-MCNC: 2.7 G/DL (ref 3.4–5)
ALBUMIN/GLOBULIN RATIO: 0.47 RATIO (ref 1.1–1.8)
ALP SERPL-CCNC: 182 U/L (ref 46–116)
ALT SERPL W P-5'-P-CCNC: 33 U/L (ref 12–78)
AMMONIA BLD-SCNC: 8 UMOL/L (ref 11–35)
ANION GAP SERPL CALC-SCNC: 9 MMOL/L (ref 3–11)
AST SERPL-CCNC: 32 U/L (ref 15–37)
BANDS: 2 % (ref 0–5)
BILIRUB SERPL-MCNC: 0.9 MG/DL (ref 0–1)
BUN SERPL-MCNC: 32 MG/DL (ref 7–18)
BUN/CREAT SERPL: 38.09 RATIO (ref 7–18)
CALCIUM SERPL-MCNC: 11.2 MG/DL (ref 8.8–10.5)
CELLS COUNTED: 100
CHLORIDE SERPL-SCNC: 104 MMOL/L (ref 100–108)
CO2 SERPL-SCNC: 30 MMOL/L (ref 21–32)
CREAT SERPL-MCNC: 0.84 MG/DL (ref 0.7–1.3)
ERYTHROCYTE [DISTWIDTH] IN BLOOD BY AUTOMATED COUNT: 14.9 % (ref 12.5–18)
GFR ESTIMATION: > 60
GLOBULIN: 5.8 G/DL (ref 2.3–3.5)
GLUCOSE SERPL-MCNC: 112 MG/DL (ref 70–110)
HCT VFR BLD AUTO: 39.2 % (ref 42–52)
HGB BLD-MCNC: 12.3 G/DL (ref 14–18)
LYMPHOCYTES NFR BLD: 9 % (ref 25–40)
MAGNESIUM SERPL-MCNC: 1.9 MG/DL (ref 1.8–2.4)
MCH RBC QN AUTO: 28.3 PG (ref 27–31.2)
MCHC RBC AUTO-ENTMCNC: 31.4 G/DL (ref 31.8–35.4)
MCV RBC AUTO: 90.3 FL (ref 80–97)
MONOCYTES NFR BLD: 7 % (ref 1–15)
NEUTROPHILS # BLD AUTO: 8.1 10*3/UL (ref 1.8–7.7)
NEUTROPHILS NFR BLD: 82 % (ref 37–80)
NUCLEATED RED BLOOD CELLS: 0 %
PLATELETS: 165 10*3/UL (ref 142–424)
POTASSIUM SERPL-SCNC: 4.7 MMOL/L (ref 3.6–5.2)
PROT SERPL-MCNC: 8.5 G/DL (ref 6.4–8.2)
RBC # BLD AUTO: 4.34 10*6/UL (ref 4.7–6.1)
RBC MORPH BLD: ABNORMAL
SMALL PLATELETS BLD QL SMEAR: ADEQUATE
SODIUM BLD-SCNC: 143 MMOL/L (ref 135–145)
WBC # BLD: 9.6 10*3/UL (ref 4.6–10.2)

## 2024-09-09 RX ORDER — LACTULOSE 10 G/15ML
10 SOLUTION ORAL 2 TIMES DAILY
Qty: 900 ML | Refills: 11 | Status: SHIPPED | OUTPATIENT
Start: 2024-09-09 | End: 2025-09-09

## 2024-09-09 RX ORDER — HEPARIN 100 UNIT/ML
500 SYRINGE INTRAVENOUS
Status: CANCELLED | OUTPATIENT
Start: 2024-09-12

## 2024-09-09 RX ORDER — SODIUM CHLORIDE 0.9 % (FLUSH) 0.9 %
10 SYRINGE (ML) INJECTION
Status: CANCELLED | OUTPATIENT
Start: 2024-09-12

## 2024-09-16 ENCOUNTER — TELEPHONE (OUTPATIENT)
Dept: HEMATOLOGY/ONCOLOGY | Facility: CLINIC | Age: 59
End: 2024-09-16
Payer: MEDICARE

## 2024-09-18 LAB
ALBUMIN SERPL BCP-MCNC: 2.3 G/DL (ref 3.4–5)
ALBUMIN/GLOBULIN RATIO: 0.41 RATIO (ref 1.1–1.8)
ALP SERPL-CCNC: 149 U/L (ref 46–116)
ALT SERPL W P-5'-P-CCNC: 21 U/L (ref 12–78)
ANION GAP SERPL CALC-SCNC: 4 MMOL/L (ref 3–11)
AST SERPL-CCNC: 28 U/L (ref 15–37)
BANDS: 2 % (ref 0–5)
BILIRUB SERPL-MCNC: 1 MG/DL (ref 0–1)
BUN SERPL-MCNC: 35 MG/DL (ref 7–18)
BUN/CREAT SERPL: 42.16 RATIO (ref 7–18)
CALCIUM SERPL-MCNC: 9.8 MG/DL (ref 8.8–10.5)
CELLS COUNTED: 100
CHLORIDE SERPL-SCNC: 105 MMOL/L (ref 100–108)
CO2 SERPL-SCNC: 32 MMOL/L (ref 21–32)
CREAT SERPL-MCNC: 0.83 MG/DL (ref 0.7–1.3)
ERYTHROCYTE [DISTWIDTH] IN BLOOD BY AUTOMATED COUNT: 15 % (ref 12.5–18)
GFR ESTIMATION: > 60
GLOBULIN: 5.6 G/DL (ref 2.3–3.5)
GLUCOSE SERPL-MCNC: 137 MG/DL (ref 70–110)
HCT VFR BLD AUTO: 36 % (ref 42–52)
HGB BLD-MCNC: 11.3 G/DL (ref 14–18)
LYMPHOCYTES NFR BLD: 7 % (ref 25–40)
MAGNESIUM SERPL-MCNC: 2.1 MG/DL (ref 1.8–2.4)
MCH RBC QN AUTO: 28.3 PG (ref 27–31.2)
MCHC RBC AUTO-ENTMCNC: 31.4 G/DL (ref 31.8–35.4)
MCV RBC AUTO: 90.2 FL (ref 80–97)
MONOCYTES NFR BLD: 12 % (ref 1–15)
NEUTROPHILS # BLD AUTO: 8.1 10*3/UL (ref 1.8–7.7)
NEUTROPHILS NFR BLD: 79 % (ref 37–80)
NUCLEATED RED BLOOD CELLS: 0 %
PLATELETS: 157 10*3/UL (ref 142–424)
POTASSIUM SERPL-SCNC: 4 MMOL/L (ref 3.6–5.2)
PROT SERPL-MCNC: 7.9 G/DL (ref 6.4–8.2)
RBC # BLD AUTO: 3.99 10*6/UL (ref 4.7–6.1)
RBC MORPH BLD: ABNORMAL
SMALL PLATELETS BLD QL SMEAR: ADEQUATE
SODIUM BLD-SCNC: 141 MMOL/L (ref 135–145)
WBC # BLD: 10 10*3/UL (ref 4.6–10.2)

## 2024-09-19 ENCOUNTER — OFFICE VISIT (OUTPATIENT)
Dept: HEMATOLOGY/ONCOLOGY | Facility: CLINIC | Age: 59
End: 2024-09-19
Payer: MEDICARE

## 2024-09-19 VITALS
BODY MASS INDEX: 14.84 KG/M2 | OXYGEN SATURATION: 97 % | WEIGHT: 112 LBS | HEIGHT: 73 IN | TEMPERATURE: 99 F | SYSTOLIC BLOOD PRESSURE: 103 MMHG | DIASTOLIC BLOOD PRESSURE: 61 MMHG | RESPIRATION RATE: 16 BRPM | HEART RATE: 98 BPM

## 2024-09-19 DIAGNOSIS — Z93.0 TRACHEOSTOMY IN PLACE: ICD-10-CM

## 2024-09-19 DIAGNOSIS — I10 ESSENTIAL (PRIMARY) HYPERTENSION: ICD-10-CM

## 2024-09-19 DIAGNOSIS — C13.9 HYPOPHARYNGEAL CANCER: Primary | ICD-10-CM

## 2024-09-19 RX ORDER — HEPARIN 100 UNIT/ML
500 SYRINGE INTRAVENOUS
Status: CANCELLED | OUTPATIENT
Start: 2024-09-19

## 2024-09-19 RX ORDER — SODIUM CHLORIDE 0.9 % (FLUSH) 0.9 %
10 SYRINGE (ML) INJECTION
Status: CANCELLED | OUTPATIENT
Start: 2024-09-19

## 2024-09-19 NOTE — PROGRESS NOTES
MEDICAL ONCOLOGY INITIAL CONSULTATION NOTE    Patient ID: Wallace D Toussaint is a 59 y.o. male.    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  He has trach in place and has been evaluated recently by radiation oncology for concurrent chemoradiation.  He presents to our clinic today for further evaluation.  He has yet to see the dentist for dental clearance prior to starting radiation treatment.  He is accompanied by his brother Pierce as patient no showed previously on 3 different occasions.         Imaging:     PET scan:   No evidence of distant metastatic disease    Past Medical History:   Diagnosis Date    Alcoholic cirrhosis of liver without ascites 06/04/2024    Last Assessment & Plan:    Formatting of this note might be different from the original.   History & Physical patient reports history of cirrhosis currently appears to be on lactulose.  Complicated by possible hepatocellular carcinoma although patient thinks this was a misdiagnosis.  Currently compensated   -Will resume lactulose when able to swallow      Discharge Summary       Follow-up      Cancer of hypopharynx     Patient was diagnosed in June but not sure of the exact date.    Essential (primary) hypertension 06/04/2024    Last Assessment & Plan:    Formatting of this note might be different from the original.   History & Physical well-controlled on admission at 125/65.   Patient on Entresto and metoprolol which we will hold for now until cleared by speech      Discharge Summary blood pressure was well-controlled despite holding his home medications.  Will continue to hold upon discharge and can follow-up with PCP f    History of subdural hematoma 06/04/2024    Last Assessment &  Plan:    Formatting of this note might be different from the original.   History & Physical per chart review.  Patient did not mention this in history      Discharge Summary       Follow-up      Liver cancer     Patient is not sure of the exact date he was diagnosed with this cancer.    Seizure disorder 2024    Last Assessment & Plan:    Formatting of this note might be different from the original.   History & Physical patient reports no seizure in the past 3 years.  Also does not follow with neurology.  Has been on Keppra prescribed by PCP.   -Will continue IV Keppra for now and transition to p.o. when able      Discharge Summary no seizure activity during the hospital stay.  Keppra resumed upon dischar     Family History   Problem Relation Name Age of Onset    Cancer Mother      Stroke Father      Hypertension Father      Hypertension Brother Patrick Toussaint      Social History     Socioeconomic History    Marital status:     Number of children: 0   Tobacco Use    Smoking status: Former     Current packs/day: 0.00     Average packs/day: 1 pack/day for 40.0 years (40.0 ttl pk-yrs)     Types: Cigarettes     Start date:      Quit date: 2020     Years since quittin.7    Smokeless tobacco: Former   Substance and Sexual Activity    Alcohol use: Not Currently    Drug use: Not Currently     Types: Marijuana     Social Determinants of Health     Food Insecurity: No Food Insecurity (2024)    Received from [facility] Subsidiaries [facility]    Hunger Vital Sign     Worried About Running Out of Food in the Last Year: Never true     Ran Out of Food in the Last Year: Never true   Transportation Needs: No Transportation Needs (2024)    Received from [facility]    PRAPARE - Transportation     Lack of Transportation (Medical): No     Lack of Transportation (Non-Medical): No      Past Surgical History:   Procedure Laterality Date    APPENDECTOMY      Patient is not sure of the exact date but this was done when he was a kid.    TRACHEOTOMY  06/2024    Patient is not sure of the exact date but it was done in june 2024         Review of systems:  Review of Systems   Constitutional:  Positive for activity change. Negative for appetite change, chills, diaphoresis, fatigue and unexpected weight change.   HENT:  Positive for trouble swallowing and voice change. Negative for congestion, facial swelling, hearing loss and mouth sores.    Eyes:  Negative for photophobia, pain, discharge and itching.   Respiratory:  Negative for apnea, cough, choking, chest tightness and shortness of breath.    Cardiovascular:  Negative for chest pain, palpitations and leg swelling.   Gastrointestinal:  Negative for abdominal distention, abdominal pain, anal bleeding and blood in stool.   Endocrine: Negative for cold intolerance, heat intolerance, polydipsia and polyphagia.   Genitourinary:  Negative for difficulty urinating, dysuria, flank pain and hematuria.   Musculoskeletal:  Negative for arthralgias, back pain, joint swelling, myalgias, neck pain and neck stiffness.   Skin:  Negative for color change, pallor and wound.   Allergic/Immunologic: Negative for environmental allergies, food allergies and immunocompromised state.   Neurological:  Negative for dizziness, seizures, facial asymmetry, speech difficulty, light-headedness, numbness and headaches.   Hematological:  Negative for adenopathy. Does not bruise/bleed easily.   Psychiatric/Behavioral:  Negative for agitation, behavioral problems, confusion, decreased concentration and sleep disturbance.               Physical Exam  Vitals and nursing note reviewed.   Constitutional:       General: He is not in acute distress.     Appearance: Normal appearance. He is not ill-appearing.   HENT:      Head: Normocephalic and atraumatic.      Nose: No congestion or  rhinorrhea.   Eyes:      General: No scleral icterus.     Extraocular Movements: Extraocular movements intact.      Pupils: Pupils are equal, round, and reactive to light.   Neck:      Comments: +ve for trach in place  Cardiovascular:      Rate and Rhythm: Normal rate and regular rhythm.      Pulses: Normal pulses.      Heart sounds: Normal heart sounds. No murmur heard.     No gallop.   Pulmonary:      Effort: Pulmonary effort is normal. No respiratory distress.      Breath sounds: Normal breath sounds. No stridor. No wheezing or rhonchi.   Abdominal:      General: Bowel sounds are normal. There is no distension.      Palpations: There is no mass.      Tenderness: There is no abdominal tenderness. There is no guarding.   Musculoskeletal:         General: No swelling, tenderness, deformity or signs of injury. Normal range of motion.      Cervical back: Normal range of motion and neck supple. No rigidity. No muscular tenderness.      Right lower leg: No edema.      Left lower leg: No edema.   Skin:     General: Skin is warm.      Coloration: Skin is not jaundiced or pale.      Findings: No bruising or lesion.   Neurological:      General: No focal deficit present.      Mental Status: He is alert and oriented to person, place, and time.      Cranial Nerves: No cranial nerve deficit.      Sensory: No sensory deficit.      Motor: No weakness.      Gait: Gait normal.   Psychiatric:         Mood and Affect: Mood normal.         Behavior: Behavior normal.         Thought Content: Thought content normal.                 There were no vitals filed for this visit.  There is no height or weight on file to calculate BSA.    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 P16 negative, PDL1 4%  == PET scan showed no evidence of distant metastatic disease.   == Recommendation per NCCN guidelines would be for high dose concurrent chemo-XRT. I will send PA for port  placement and chemotherapy with weekly Cisplatin 40 mg/m2 to be given with radiation.  ==08/20/2024: audiogram ordered for cisplatin, pt had teeth removed and peg has been placed.  Patient here today to discuss upcoming chemotherapy/immunotherapy. An extensive discussion was had which included a thorough discussion of potential side effect profile, risk versus benefits, and expected outcomes.  Risks, including but not limited to, possible hair loss, bone marrow damage, damage to body organs, allergic reactions, sterility, nausea/vomiting, constipation/diarrhea, sores in the mouth, secondary cancers, and rarely death were all discuss.  Specific side effects pertaining to their chemotherapy/immunotherapy medications were discussed as well.  The patient agrees with the plan and all questions and their support systems questions have been answered to their satisfaction.  Premedications were prescribed and patient was educated on appropriate usage.  Patient was educated on when to call, and given the number to call, or to notify the provider including but not limited to:  Persistent nausea and vomiting, dehydration, fever greater than 100.4 lasting over 1 hour induration or isolated feeders greater than 101, rash while on active chemotherapy or immunotherapy, severe pain or new onset pain not controlled by current pain medication regimen.  ==09/09/2024: Patient started XRT, did not get labs, drawn today approx 11am, will not be resulted in time to start cisplatin today.  Audiogram reviewed, grossly normal hearing.  Pt is on lactulose, requests refill, will send refill. Educated to hold if diarrhea.  Labs resulted after visit and reviewed, SrCr wnl, 0.84, Magnesium wnl, 1.9. Pt is cleared to start chemotherapy on 9/10/2024.  ==09/19/2024: Pt had first chemotherapy on 9/12/24, tolerated well, no nausea.  Trach and peg tube in place functioning well. No complaints, labs reviewed, pt cleared for chemotherapy.      2. H/O  Hepatocellular Carcinoma:    == Reported by patient and on his notes from PCP  == Do not have any prior treatment records at this time. He reports being followed in [place]. Will obtain those treatment records from 2020    3. Hypertension  ==continue to follow up with pcp      Plan:  Continue weekly Cisplatin concurrent with XRT  Cbc cmp mg weekly  Rtc 1 week np visit  Cbc cmp mg prior to next appt    Future Appointments   Date Time Provider Department Center   9/19/2024 10:20 AM Reva Giordano NP [facility]ON [facility]    10/16/2024  9:45 AM Daniel Resendiz MD [facility] PRIC[facility]          Total time spent in counseling and discussion about further management options including relevant lab work, treatment,  prognosis, medications and intended side effects was more than 40 minutes. More than 50% of the time was spent on counseling and coordination of care.  I spent a total of 60 minutes on the day of the visit.This includes face to face time and non-face to face time preparing to see the patient (eg, review of tests), Obtaining and/or reviewing separately obtained history, Documenting clinical information in the electronic or other health record, Independently interpreting resultsand communicating results to the patient/family/caregiver, or Care coordination.

## 2024-09-25 LAB
ALBUMIN SERPL BCP-MCNC: 2.1 G/DL (ref 3.4–5)
ALBUMIN/GLOBULIN RATIO: 0.42 RATIO (ref 1.1–1.8)
ALP SERPL-CCNC: 160 U/L (ref 46–116)
ALT SERPL W P-5'-P-CCNC: 28 U/L (ref 12–78)
ANION GAP SERPL CALC-SCNC: 7 MMOL/L (ref 3–11)
AST SERPL-CCNC: 27 U/L (ref 15–37)
BANDS: 2 % (ref 0–5)
BILIRUB SERPL-MCNC: 0.9 MG/DL (ref 0–1)
BUN SERPL-MCNC: 30 MG/DL (ref 7–18)
BUN/CREAT SERPL: 35.71 RATIO (ref 7–18)
CALCIUM SERPL-MCNC: 9.2 MG/DL (ref 8.8–10.5)
CELLS COUNTED: 100
CHLORIDE SERPL-SCNC: 106 MMOL/L (ref 100–108)
CO2 SERPL-SCNC: 30 MMOL/L (ref 21–32)
CREAT SERPL-MCNC: 0.84 MG/DL (ref 0.7–1.3)
EOSINOPHIL NFR BLD: 2 % (ref 1–3)
ERYTHROCYTE [DISTWIDTH] IN BLOOD BY AUTOMATED COUNT: 15.4 % (ref 12.5–18)
GFR ESTIMATION: > 60
GLOBULIN: 5 G/DL (ref 2.3–3.5)
GLUCOSE SERPL-MCNC: 140 MG/DL (ref 70–110)
HCT VFR BLD AUTO: 32.4 % (ref 42–52)
HGB BLD-MCNC: 10.3 G/DL (ref 14–18)
LYMPHOCYTES NFR BLD: 2 % (ref 25–40)
MAGNESIUM SERPL-MCNC: 2 MG/DL (ref 1.8–2.4)
MCH RBC QN AUTO: 28.5 PG (ref 27–31.2)
MCHC RBC AUTO-ENTMCNC: 31.8 G/DL (ref 31.8–35.4)
MCV RBC AUTO: 89.5 FL (ref 80–97)
MONOCYTES NFR BLD: 12 % (ref 1–15)
NEUTROPHILS # BLD AUTO: 7.7 10*3/UL (ref 1.8–7.7)
NEUTROPHILS NFR BLD: 82 % (ref 37–80)
NUCLEATED RED BLOOD CELLS: 0 %
PLATELETS: 153 10*3/UL (ref 142–424)
POTASSIUM SERPL-SCNC: 4 MMOL/L (ref 3.6–5.2)
PROT SERPL-MCNC: 7.1 G/DL (ref 6.4–8.2)
RBC # BLD AUTO: 3.62 10*6/UL (ref 4.7–6.1)
RBC MORPH BLD: ABNORMAL
SMALL PLATELETS BLD QL SMEAR: ADEQUATE
SODIUM BLD-SCNC: 143 MMOL/L (ref 135–145)
WBC # BLD: 9.2 10*3/UL (ref 4.6–10.2)

## 2024-09-26 ENCOUNTER — OFFICE VISIT (OUTPATIENT)
Dept: HEMATOLOGY/ONCOLOGY | Facility: CLINIC | Age: 59
End: 2024-09-26
Payer: MEDICARE

## 2024-09-26 VITALS
HEART RATE: 88 BPM | HEIGHT: 73 IN | WEIGHT: 110.5 LBS | SYSTOLIC BLOOD PRESSURE: 109 MMHG | RESPIRATION RATE: 16 BRPM | OXYGEN SATURATION: 95 % | DIASTOLIC BLOOD PRESSURE: 71 MMHG | BODY MASS INDEX: 14.64 KG/M2

## 2024-09-26 DIAGNOSIS — C22.0 HEPATOCELLULAR CARCINOMA: ICD-10-CM

## 2024-09-26 DIAGNOSIS — R11.2 CINV (CHEMOTHERAPY-INDUCED NAUSEA AND VOMITING): ICD-10-CM

## 2024-09-26 DIAGNOSIS — C13.9 HYPOPHARYNGEAL CANCER: Primary | ICD-10-CM

## 2024-09-26 DIAGNOSIS — Z93.0 TRACHEOSTOMY IN PLACE: ICD-10-CM

## 2024-09-26 DIAGNOSIS — D64.81 ANTINEOPLASTIC CHEMOTHERAPY INDUCED ANEMIA: ICD-10-CM

## 2024-09-26 DIAGNOSIS — T45.1X5A CINV (CHEMOTHERAPY-INDUCED NAUSEA AND VOMITING): ICD-10-CM

## 2024-09-26 DIAGNOSIS — E43 SEVERE PROTEIN-CALORIE MALNUTRITION: ICD-10-CM

## 2024-09-26 DIAGNOSIS — T45.1X5A ANTINEOPLASTIC CHEMOTHERAPY INDUCED ANEMIA: ICD-10-CM

## 2024-09-26 PROCEDURE — 3008F BODY MASS INDEX DOCD: CPT | Mod: CPTII,,, | Performed by: NURSE PRACTITIONER

## 2024-09-26 PROCEDURE — G2211 COMPLEX E/M VISIT ADD ON: HCPCS | Mod: S$PBB,,, | Performed by: NURSE PRACTITIONER

## 2024-09-26 PROCEDURE — 3074F SYST BP LT 130 MM HG: CPT | Mod: CPTII,,, | Performed by: NURSE PRACTITIONER

## 2024-09-26 PROCEDURE — 3078F DIAST BP <80 MM HG: CPT | Mod: CPTII,,, | Performed by: NURSE PRACTITIONER

## 2024-09-26 PROCEDURE — 3044F HG A1C LEVEL LT 7.0%: CPT | Mod: CPTII,,, | Performed by: NURSE PRACTITIONER

## 2024-09-26 PROCEDURE — 4010F ACE/ARB THERAPY RXD/TAKEN: CPT | Mod: CPTII,,, | Performed by: NURSE PRACTITIONER

## 2024-09-26 PROCEDURE — 1159F MED LIST DOCD IN RCRD: CPT | Mod: CPTII,,, | Performed by: NURSE PRACTITIONER

## 2024-09-26 PROCEDURE — 99215 OFFICE O/P EST HI 40 MIN: CPT | Mod: S$PBB,,, | Performed by: NURSE PRACTITIONER

## 2024-09-26 RX ORDER — SODIUM CHLORIDE 0.9 % (FLUSH) 0.9 %
10 SYRINGE (ML) INJECTION
OUTPATIENT
Start: 2024-09-26

## 2024-09-26 RX ORDER — HEPARIN 100 UNIT/ML
500 SYRINGE INTRAVENOUS
OUTPATIENT
Start: 2024-09-26

## 2024-09-26 NOTE — PROGRESS NOTES
MEDICAL ONCOLOGY INITIAL CONSULTATION NOTE    Patient ID: Wallace D Toussaint is a 59 y.o. male.    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  He has trach in place and has been evaluated recently by radiation oncology for concurrent chemoradiation.  He presents to our clinic today for further evaluation.  He has yet to see the dentist for dental clearance prior to starting radiation treatment.  He is accompanied by his brother Pierce as patient no showed previously on 3 different occasions.         Imaging:     PET scan:   No evidence of distant metastatic disease    Past Medical History:   Diagnosis Date    Alcoholic cirrhosis of liver without ascites 06/04/2024    Last Assessment & Plan:    Formatting of this note might be different from the original.   History & Physical patient reports history of cirrhosis currently appears to be on lactulose.  Complicated by possible hepatocellular carcinoma although patient thinks this was a misdiagnosis.  Currently compensated   -Will resume lactulose when able to swallow      Discharge Summary       Follow-up      Cancer of hypopharynx     Patient was diagnosed in June but not sure of the exact date.    Essential (primary) hypertension 06/04/2024    Last Assessment & Plan:    Formatting of this note might be different from the original.   History & Physical well-controlled on admission at 125/65.   Patient on Entresto and metoprolol which we will hold for now until cleared by speech      Discharge Summary blood pressure was well-controlled despite holding his home medications.  Will continue to hold upon discharge and can follow-up with PCP f    History of subdural hematoma 06/04/2024    Last Assessment &  Plan:    Formatting of this note might be different from the original.   History & Physical per chart review.  Patient did not mention this in history      Discharge Summary       Follow-up      Liver cancer     Patient is not sure of the exact date he was diagnosed with this cancer.    Seizure disorder 2024    Last Assessment & Plan:    Formatting of this note might be different from the original.   History & Physical patient reports no seizure in the past 3 years.  Also does not follow with neurology.  Has been on Keppra prescribed by PCP.   -Will continue IV Keppra for now and transition to p.o. when able      Discharge Summary no seizure activity during the hospital stay.  Keppra resumed upon dischar     Family History   Problem Relation Name Age of Onset    Cancer Mother      Stroke Father      Hypertension Father      Hypertension Brother Patrick Toussaint      Social History     Socioeconomic History    Marital status:     Number of children: 0   Tobacco Use    Smoking status: Former     Current packs/day: 0.00     Average packs/day: 1 pack/day for 40.0 years (40.0 ttl pk-yrs)     Types: Cigarettes     Start date:      Quit date: 2020     Years since quittin.7    Smokeless tobacco: Former   Substance and Sexual Activity    Alcohol use: Not Currently    Drug use: Not Currently     Types: Marijuana     Social Determinants of Health     Food Insecurity: No Food Insecurity (2024)    Received from [facility]sidi[facility] Aff[facility]    Hunger Vital Sign     Worried About Running Out of Food in the Last Year: Never true     Ran Out of Food in the Last Year: Never true   Transportation Needs: No Transportation Needs (2024)    Received from [facility]    PRAPARE - Transportation     Lack of Transportation (Medical): No     Lack of Transportation (Non-Medical): No      Past Surgical History:   Procedure Laterality Date    APPENDECTOMY      Patient is not sure of the exact date but this was done when he was a kid.    TRACHEOTOMY  06/2024    Patient is not sure of the exact date but it was done in june 2024         Review of systems:  Review of Systems   Constitutional:  Positive for activity change. Negative for appetite change, chills, diaphoresis, fatigue and unexpected weight change.   HENT:  Positive for trouble swallowing and voice change. Negative for congestion, facial swelling, hearing loss and mouth sores.    Eyes:  Negative for photophobia, pain, discharge and itching.   Respiratory:  Negative for apnea, cough, choking, chest tightness and shortness of breath.    Cardiovascular:  Negative for chest pain, palpitations and leg swelling.   Gastrointestinal:  Negative for abdominal distention, abdominal pain, anal bleeding and blood in stool.   Endocrine: Negative for cold intolerance, heat intolerance, polydipsia and polyphagia.   Genitourinary:  Negative for difficulty urinating, dysuria, flank pain and hematuria.   Musculoskeletal:  Negative for arthralgias, back pain, joint swelling, myalgias, neck pain and neck stiffness.   Skin:  Negative for color change, pallor and wound.   Allergic/Immunologic: Negative for environmental allergies, food allergies and immunocompromised state.   Neurological:  Negative for dizziness, seizures, facial asymmetry, speech difficulty, light-headedness, numbness and headaches.   Hematological:  Negative for adenopathy. Does not bruise/bleed easily.   Psychiatric/Behavioral:  Negative for agitation, behavioral problems, confusion, decreased concentration and sleep disturbance.               Physical Exam  Vitals and nursing note reviewed.   Constitutional:       General: He is not in acute distress.     Appearance: Normal appearance. He is not ill-appearing.   HENT:      Head: Normocephalic and atraumatic.      Nose: No congestion or  rhinorrhea.   Eyes:      General: No scleral icterus.     Extraocular Movements: Extraocular movements intact.      Pupils: Pupils are equal, round, and reactive to light.   Neck:      Comments: +ve for trach in place  Cardiovascular:      Rate and Rhythm: Normal rate and regular rhythm.      Pulses: Normal pulses.      Heart sounds: Normal heart sounds. No murmur heard.     No gallop.   Pulmonary:      Effort: Pulmonary effort is normal. No respiratory distress.      Breath sounds: Normal breath sounds. No stridor. No wheezing or rhonchi.   Abdominal:      General: Bowel sounds are normal. There is no distension.      Palpations: There is no mass.      Tenderness: There is no abdominal tenderness. There is no guarding.   Musculoskeletal:         General: No swelling, tenderness, deformity or signs of injury. Normal range of motion.      Cervical back: Normal range of motion and neck supple. No rigidity. No muscular tenderness.      Right lower leg: No edema.      Left lower leg: No edema.   Skin:     General: Skin is warm.      Coloration: Skin is not jaundiced or pale.      Findings: No bruising or lesion.   Neurological:      General: No focal deficit present.      Mental Status: He is alert and oriented to person, place, and time.      Cranial Nerves: No cranial nerve deficit.      Sensory: No sensory deficit.      Motor: No weakness.      Gait: Gait normal.   Psychiatric:         Mood and Affect: Mood normal.         Behavior: Behavior normal.         Thought Content: Thought content normal.                   Vitals:    09/26/24 1014   BP: 109/71   Pulse: 88   Resp: 16     Body surface area is 1.61 meters squared.    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 P16 negative, PDL1 4%  == PET scan showed no evidence of distant metastatic disease.   == Recommendation per NCCN guidelines would be for high dose concurrent chemo-XRT. I will send PA  for port placement and chemotherapy with weekly Cisplatin 40 mg/m2 to be given with radiation.  ==08/20/2024: audiogram ordered for cisplatin, pt had teeth removed and peg has been placed.  Patient here today to discuss upcoming chemotherapy/immunotherapy. An extensive discussion was had which included a thorough discussion of potential side effect profile, risk versus benefits, and expected outcomes.  Risks, including but not limited to, possible hair loss, bone marrow damage, damage to body organs, allergic reactions, sterility, nausea/vomiting, constipation/diarrhea, sores in the mouth, secondary cancers, and rarely death were all discuss.  Specific side effects pertaining to their chemotherapy/immunotherapy medications were discussed as well.  The patient agrees with the plan and all questions and their support systems questions have been answered to their satisfaction.  Premedications were prescribed and patient was educated on appropriate usage.  Patient was educated on when to call, and given the number to call, or to notify the provider including but not limited to:  Persistent nausea and vomiting, dehydration, fever greater than 100.4 lasting over 1 hour induration or isolated feeders greater than 101, rash while on active chemotherapy or immunotherapy, severe pain or new onset pain not controlled by current pain medication regimen.  ==09/09/2024: Patient started XRT, did not get labs, drawn today approx 11am, will not be resulted in time to start cisplatin today.  Audiogram reviewed, grossly normal hearing.  Pt is on lactulose, requests refill, will send refill. Educated to hold if diarrhea.  Labs resulted after visit and reviewed, SrCr wnl, 0.84, Magnesium wnl, 1.9. Pt is cleared to start chemotherapy on 9/10/2024.  ==09/19/2024: Pt had first chemotherapy on 9/12/24, tolerated well, no nausea.  Trach and peg tube in place functioning well. No complaints, labs reviewed, pt cleared for chemotherapy.      2.  H/O Hepatocellular Carcinoma:    == Reported by patient and on his notes from PCP  == Do not have any prior treatment records at this time. He reports being followed in [place]. Will obtain those treatment records from 2020    3. Hypertension  ==continue to follow up with pcp    4. Chemo induced anemia  HGB 10.8 will continue to monitor    5. Cancer cachexia  Continues to use % with 5-6 cans of nutritional supplement         Plan:  Continue weekly Cisplatin concurrent with XRT  Cbc cmp mg weekly  Rtc 1 week np visit  Cbc cmp mg prior to next appt    Future Appointments   Date Time Provider Department Center   9/30/2024 10:00 AM Gera Giang MD [facility]   10/16/2024  9:45 AM Daniel Resendiz MD [facility] PRICG5 JEFF Rachel          Total time spent in counseling and discussion about further management options including relevant lab work, treatment,  prognosis, medications and intended side effects was more than 40 minutes. More than 50% of the time was spent on counseling and coordination of care.  I spent a total of 60 minutes on the day of the visit.This includes face to face time and non-face to face time preparing to see the patient (eg, review of tests), Obtaining and/or reviewing separately obtained history, Documenting clinical information in the electronic or other health record, Independently interpreting resultsand communicating results to the patient/family/caregiver, or Care coordination.

## 2024-09-30 ENCOUNTER — OFFICE VISIT (OUTPATIENT)
Dept: SURGERY | Facility: CLINIC | Age: 59
End: 2024-09-30
Payer: MEDICARE

## 2024-09-30 DIAGNOSIS — Z98.890 POST-OPERATIVE STATE: Primary | ICD-10-CM

## 2024-09-30 PROCEDURE — 3044F HG A1C LEVEL LT 7.0%: CPT | Mod: CPTII,,, | Performed by: SURGERY

## 2024-09-30 PROCEDURE — 1159F MED LIST DOCD IN RCRD: CPT | Mod: CPTII,,, | Performed by: SURGERY

## 2024-09-30 PROCEDURE — 1160F RVW MEDS BY RX/DR IN RCRD: CPT | Mod: CPTII,,, | Performed by: SURGERY

## 2024-09-30 PROCEDURE — 4010F ACE/ARB THERAPY RXD/TAKEN: CPT | Mod: CPTII,,, | Performed by: SURGERY

## 2024-09-30 PROCEDURE — 99024 POSTOP FOLLOW-UP VISIT: CPT | Mod: ,,, | Performed by: SURGERY

## 2024-09-30 NOTE — PROGRESS NOTES
HPI:  Postop left subclavian venous access port insertion.  Port has been used and reports worked well    PHYSICAL EXAM:  Incisions are clean and dry and all sutures removed  ASSESSMENT:    Stable  PLAN:      Revisit as needed

## 2024-10-02 LAB
ALBUMIN SERPL BCP-MCNC: 2.1 G/DL (ref 3.4–5)
ALBUMIN/GLOBULIN RATIO: 0.53 RATIO (ref 1.1–1.8)
ALP SERPL-CCNC: 173 U/L (ref 46–116)
ALT SERPL W P-5'-P-CCNC: 31 U/L (ref 12–78)
ANION GAP SERPL CALC-SCNC: 4 MMOL/L (ref 3–11)
AST SERPL-CCNC: 26 U/L (ref 15–37)
BANDS: 2 % (ref 0–5)
BILIRUB SERPL-MCNC: 0.6 MG/DL (ref 0–1)
BUN SERPL-MCNC: 18 MG/DL (ref 7–18)
BUN/CREAT SERPL: 30 RATIO (ref 7–18)
CALCIUM SERPL-MCNC: 8.4 MG/DL (ref 8.8–10.5)
CELLS COUNTED: 100
CHLORIDE SERPL-SCNC: 98 MMOL/L (ref 100–108)
CO2 SERPL-SCNC: 31 MMOL/L (ref 21–32)
CREAT SERPL-MCNC: 0.6 MG/DL (ref 0.7–1.3)
EOSINOPHIL NFR BLD: 3 % (ref 1–3)
ERYTHROCYTE [DISTWIDTH] IN BLOOD BY AUTOMATED COUNT: 16.1 % (ref 12.5–18)
GFR ESTIMATION: > 60
GLOBULIN: 4 G/DL (ref 2.3–3.5)
GLUCOSE SERPL-MCNC: 122 MG/DL (ref 70–110)
HCT VFR BLD AUTO: 29.8 % (ref 42–52)
HGB BLD-MCNC: 9.5 G/DL (ref 14–18)
LYMPHOCYTES NFR BLD: 7 % (ref 25–40)
MAGNESIUM SERPL-MCNC: 1.6 MG/DL (ref 1.8–2.4)
MCH RBC QN AUTO: 28.4 PG (ref 27–31.2)
MCHC RBC AUTO-ENTMCNC: 31.9 G/DL (ref 31.8–35.4)
MCV RBC AUTO: 89 FL (ref 80–97)
MONOCYTES NFR BLD: 16 % (ref 1–15)
NEUTROPHILS # BLD AUTO: 3.8 10*3/UL (ref 1.8–7.7)
NEUTROPHILS NFR BLD: 72 % (ref 37–80)
NUCLEATED RED BLOOD CELLS: 0 %
PLATELETS: 152 10*3/UL (ref 142–424)
POTASSIUM SERPL-SCNC: 4.1 MMOL/L (ref 3.6–5.2)
PROT SERPL-MCNC: 6.1 G/DL (ref 6.4–8.2)
RBC # BLD AUTO: 3.35 10*6/UL (ref 4.7–6.1)
RBC MORPH BLD: ABNORMAL
SMALL PLATELETS BLD QL SMEAR: ADEQUATE
SODIUM BLD-SCNC: 133 MMOL/L (ref 135–145)
WBC # BLD: 5.2 10*3/UL (ref 4.6–10.2)

## 2024-10-03 ENCOUNTER — OFFICE VISIT (OUTPATIENT)
Dept: HEMATOLOGY/ONCOLOGY | Facility: CLINIC | Age: 59
End: 2024-10-03
Payer: MEDICARE

## 2024-10-03 VITALS
HEIGHT: 73 IN | OXYGEN SATURATION: 97 % | RESPIRATION RATE: 16 BRPM | HEART RATE: 79 BPM | BODY MASS INDEX: 15.33 KG/M2 | DIASTOLIC BLOOD PRESSURE: 70 MMHG | WEIGHT: 115.69 LBS | TEMPERATURE: 97 F | SYSTOLIC BLOOD PRESSURE: 120 MMHG

## 2024-10-03 DIAGNOSIS — K59.00 CONSTIPATION, UNSPECIFIED CONSTIPATION TYPE: ICD-10-CM

## 2024-10-03 DIAGNOSIS — I10 ESSENTIAL (PRIMARY) HYPERTENSION: ICD-10-CM

## 2024-10-03 DIAGNOSIS — I10 HYPERTENSION, UNSPECIFIED TYPE: ICD-10-CM

## 2024-10-03 DIAGNOSIS — Z93.0 TRACHEOSTOMY IN PLACE: ICD-10-CM

## 2024-10-03 DIAGNOSIS — C22.0 HEPATOCELLULAR CARCINOMA: ICD-10-CM

## 2024-10-03 DIAGNOSIS — R11.2 CINV (CHEMOTHERAPY-INDUCED NAUSEA AND VOMITING): ICD-10-CM

## 2024-10-03 DIAGNOSIS — T45.1X5A CINV (CHEMOTHERAPY-INDUCED NAUSEA AND VOMITING): ICD-10-CM

## 2024-10-03 DIAGNOSIS — E43 SEVERE PROTEIN-CALORIE MALNUTRITION: ICD-10-CM

## 2024-10-03 DIAGNOSIS — E83.42 HYPOMAGNESEMIA: ICD-10-CM

## 2024-10-03 DIAGNOSIS — C13.9 HYPOPHARYNGEAL CANCER: Primary | ICD-10-CM

## 2024-10-03 DIAGNOSIS — T45.1X5A ANTINEOPLASTIC CHEMOTHERAPY INDUCED ANEMIA: ICD-10-CM

## 2024-10-03 DIAGNOSIS — D64.81 ANTINEOPLASTIC CHEMOTHERAPY INDUCED ANEMIA: ICD-10-CM

## 2024-10-03 PROCEDURE — 3078F DIAST BP <80 MM HG: CPT | Mod: CPTII,,, | Performed by: NURSE PRACTITIONER

## 2024-10-03 PROCEDURE — 99215 OFFICE O/P EST HI 40 MIN: CPT | Mod: S$PBB,,, | Performed by: NURSE PRACTITIONER

## 2024-10-03 PROCEDURE — 4010F ACE/ARB THERAPY RXD/TAKEN: CPT | Mod: CPTII,,, | Performed by: NURSE PRACTITIONER

## 2024-10-03 PROCEDURE — 3008F BODY MASS INDEX DOCD: CPT | Mod: CPTII,,, | Performed by: NURSE PRACTITIONER

## 2024-10-03 PROCEDURE — 1159F MED LIST DOCD IN RCRD: CPT | Mod: CPTII,,, | Performed by: NURSE PRACTITIONER

## 2024-10-03 PROCEDURE — 3044F HG A1C LEVEL LT 7.0%: CPT | Mod: CPTII,,, | Performed by: NURSE PRACTITIONER

## 2024-10-03 PROCEDURE — G2211 COMPLEX E/M VISIT ADD ON: HCPCS | Mod: S$PBB,,, | Performed by: NURSE PRACTITIONER

## 2024-10-03 PROCEDURE — 3074F SYST BP LT 130 MM HG: CPT | Mod: CPTII,,, | Performed by: NURSE PRACTITIONER

## 2024-10-03 RX ORDER — SODIUM CHLORIDE 0.9 % (FLUSH) 0.9 %
10 SYRINGE (ML) INJECTION
OUTPATIENT
Start: 2024-10-03

## 2024-10-03 RX ORDER — HEPARIN 100 UNIT/ML
500 SYRINGE INTRAVENOUS
OUTPATIENT
Start: 2024-10-03

## 2024-10-03 NOTE — PROGRESS NOTES
MEDICAL ONCOLOGY INITIAL CONSULTATION NOTE    Patient ID: Wallace D Toussaint is a 59 y.o. male.    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  He has trach in place and has been evaluated recently by radiation oncology for concurrent chemoradiation.  He presents to our clinic today for further evaluation.  He has yet to see the dentist for dental clearance prior to starting radiation treatment.  He is accompanied by his brother Pierce as patient no showed previously on 3 different occasions.         Imaging:     PET scan:   No evidence of distant metastatic disease    Past Medical History:   Diagnosis Date    Alcoholic cirrhosis of liver without ascites 06/04/2024    Last Assessment & Plan:    Formatting of this note might be different from the original.   History & Physical patient reports history of cirrhosis currently appears to be on lactulose.  Complicated by possible hepatocellular carcinoma although patient thinks this was a misdiagnosis.  Currently compensated   -Will resume lactulose when able to swallow      Discharge Summary       Follow-up      Cancer of hypopharynx     Patient was diagnosed in June but not sure of the exact date.    Essential (primary) hypertension 06/04/2024    Last Assessment & Plan:    Formatting of this note might be different from the original.   History & Physical well-controlled on admission at 125/65.   Patient on Entresto and metoprolol which we will hold for now until cleared by speech      Discharge Summary blood pressure was well-controlled despite holding his home medications.  Will continue to hold upon discharge and can follow-up with PCP f    History of subdural hematoma 06/04/2024    Last Assessment &  Plan:    Formatting of this note might be different from the original.   History & Physical per chart review.  Patient did not mention this in history      Discharge Summary       Follow-up      Liver cancer     Patient is not sure of the exact date he was diagnosed with this cancer.    Seizure disorder 2024    Last Assessment & Plan:    Formatting of this note might be different from the original.   History & Physical patient reports no seizure in the past 3 years.  Also does not follow with neurology.  Has been on Keppra prescribed by PCP.   -Will continue IV Keppra for now and transition to p.o. when able      Discharge Summary no seizure activity during the hospital stay.  Keppra resumed upon dischar     Family History   Problem Relation Name Age of Onset    Cancer Mother      Stroke Father      Hypertension Father      Hypertension Brother Patrick Toussaint      Social History     Socioeconomic History    Marital status:     Number of children: 0   Tobacco Use    Smoking status: Former     Current packs/day: 0.00     Average packs/day: 1 pack/day for 40.0 years (40.0 ttl pk-yrs)     Types: Cigarettes     Start date:      Quit date: 2020     Years since quittin.7    Smokeless tobacco: Former   Substance and Sexual Activity    Alcohol use: Not Currently    Drug use: Not Currently     Types: Marijuana     Social Drivers of Health     Food Insecurity: No Food Insecurity (2024)    Received from [employer] [facility] and Its Subsidiaries and Affiliates    Hunger Vital Sign     Worried About Running Out of Food in the Last Year: Never true     Ran Out of Food in the Last Year: Never true   Transportation Needs: No Transportation Needs (2024)    Received from [employer] [facility] and Its Subsidiaries and Affiliates    PRAPARE - Transportation     Lack of Transportation (Medical): No     Lack of Transportation (Non-Medical): No      Past Surgical History:   Procedure Laterality Date    APPENDECTOMY      Patient is not sure of the exact date but this was done when he was a kid.    TRACHEOTOMY  06/2024    Patient is not sure of the exact date but it was done in june 2024         Review of systems:  Review of Systems   Constitutional:  Positive for activity change. Negative for appetite change, chills, diaphoresis, fatigue and unexpected weight change.   HENT:  Positive for trouble swallowing and voice change. Negative for congestion, facial swelling, hearing loss and mouth sores.    Eyes:  Negative for photophobia, pain, discharge and itching.   Respiratory:  Negative for apnea, cough, choking, chest tightness and shortness of breath.    Cardiovascular:  Negative for chest pain, palpitations and leg swelling.   Gastrointestinal:  Negative for abdominal distention, abdominal pain, anal bleeding and blood in stool.   Endocrine: Negative for cold intolerance, heat intolerance, polydipsia and polyphagia.   Genitourinary:  Negative for difficulty urinating, dysuria, flank pain and hematuria.   Musculoskeletal:  Negative for arthralgias, back pain, joint swelling, myalgias, neck pain and neck stiffness.   Skin:  Negative for color change, pallor and wound.   Allergic/Immunologic: Negative for environmental allergies, food allergies and immunocompromised state.   Neurological:  Negative for dizziness, seizures, facial asymmetry, speech difficulty, light-headedness, numbness and headaches.   Hematological:  Negative for adenopathy. Does not bruise/bleed easily.   Psychiatric/Behavioral:  Negative for agitation, behavioral problems, confusion, decreased concentration and sleep disturbance.               Physical Exam  Vitals and nursing note reviewed.   Constitutional:       General: He is not in acute distress.     Appearance: Normal appearance. He is not ill-appearing.   HENT:      Head: Normocephalic and atraumatic.      Nose: No congestion or  rhinorrhea.   Eyes:      General: No scleral icterus.     Extraocular Movements: Extraocular movements intact.      Pupils: Pupils are equal, round, and reactive to light.   Neck:      Comments: +ve for trach in place  Cardiovascular:      Rate and Rhythm: Normal rate and regular rhythm.      Pulses: Normal pulses.      Heart sounds: Normal heart sounds. No murmur heard.     No gallop.   Pulmonary:      Effort: Pulmonary effort is normal. No respiratory distress.      Breath sounds: Normal breath sounds. No stridor. No wheezing or rhonchi.   Abdominal:      General: Bowel sounds are normal. There is no distension.      Palpations: There is no mass.      Tenderness: There is no abdominal tenderness. There is no guarding.   Musculoskeletal:         General: No swelling, tenderness, deformity or signs of injury. Normal range of motion.      Cervical back: Normal range of motion and neck supple. No rigidity. No muscular tenderness.      Right lower leg: No edema.      Left lower leg: No edema.   Skin:     General: Skin is warm.      Coloration: Skin is not jaundiced or pale.      Findings: No bruising or lesion.   Neurological:      General: No focal deficit present.      Mental Status: He is alert and oriented to person, place, and time.      Cranial Nerves: No cranial nerve deficit.      Sensory: No sensory deficit.      Motor: No weakness.      Gait: Gait normal.   Psychiatric:         Mood and Affect: Mood normal.         Behavior: Behavior normal.         Thought Content: Thought content normal.                 There were no vitals filed for this visit.    There is no height or weight on file to calculate BSA.    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 P16 negative, PDL1 4%  == PET scan showed no evidence of distant metastatic disease.   == Recommendation per NCCN guidelines would be for high dose concurrent chemo-XRT. I will send PA for port  placement and chemotherapy with weekly Cisplatin 40 mg/m2 to be given with radiation.  ==08/20/2024: audiogram ordered for cisplatin, pt had teeth removed and peg has been placed.  Patient here today to discuss upcoming chemotherapy/immunotherapy. An extensive discussion was had which included a thorough discussion of potential side effect profile, risk versus benefits, and expected outcomes.  Risks, including but not limited to, possible hair loss, bone marrow damage, damage to body organs, allergic reactions, sterility, nausea/vomiting, constipation/diarrhea, sores in the mouth, secondary cancers, and rarely death were all discuss.  Specific side effects pertaining to their chemotherapy/immunotherapy medications were discussed as well.  The patient agrees with the plan and all questions and their support systems questions have been answered to their satisfaction.  Premedications were prescribed and patient was educated on appropriate usage.  Patient was educated on when to call, and given the number to call, or to notify the provider including but not limited to:  Persistent nausea and vomiting, dehydration, fever greater than 100.4 lasting over 1 hour induration or isolated feeders greater than 101, rash while on active chemotherapy or immunotherapy, severe pain or new onset pain not controlled by current pain medication regimen.  ==09/09/2024: Patient started XRT, did not get labs, drawn today approx 11am, will not be resulted in time to start cisplatin today.  Audiogram reviewed, grossly normal hearing.  Pt is on lactulose, requests refill, will send refill. Educated to hold if diarrhea.  Labs resulted after visit and reviewed, SrCr wnl, 0.84, Magnesium wnl, 1.9. Pt is cleared to start chemotherapy on 9/10/2024.  ==09/19/2024: Pt had first chemotherapy on 9/12/24, tolerated well, no nausea.  Trach and peg tube in place functioning well. No complaints, labs reviewed, pt cleared for chemotherapy.  ==10/03/2024:  Still having some nausea with chemotherapy relieved with ondansetron, no other complaints. Mg 1.6, pt will receive mg in pre and post hydration fluids with cisplatin.  Labs reviewed pt is cleared for chemotherapy.  Visible improvement noted to right neck mass        2. H/O Hepatocellular Carcinoma:    == Reported by patient and on his notes from PCP  == Do not have any prior treatment records at this time. He reports being followed in [place]. Will obtain those treatment records from 2020    3. Hypertension  ==continue to follow up with pcp    4. Chemo induced anemia  HGB 10.8 will continue to monitor    5. Cancer cachexia  Continues to use % with 5-6 cans of nutritional supplement         Plan:  Continue weekly Cisplatin concurrent with XRT  Cbc cmp mg weekly  Rtc 1 week np visit  Cbc cmp mg prior to next appt    Future Appointments   Date Time Provider Department Center   10/3/2024  9:40 AM Reva Giordano NP [facility]   10/16/2024  9:45 AM Daniel Resendiz MD [facility]          Total time spent in counseling and discussion about further management options including relevant lab work, treatment,  prognosis, medications and intended side effects was more than 40 minutes. More than 50% of the time was spent on counseling and coordination of care.  I spent a total of 40 minutes on the day of the visit.This includes face to face time and non-face to face time preparing to see the patient (eg, review of tests), Obtaining and/or reviewing separately obtained history, Documenting clinical information in the electronic or other health record, Independently interpreting resultsand communicating results to the patient/family/caregiver, or Care coordination.

## 2024-10-10 ENCOUNTER — OFFICE VISIT (OUTPATIENT)
Dept: HEMATOLOGY/ONCOLOGY | Facility: CLINIC | Age: 59
End: 2024-10-10
Payer: MEDICARE

## 2024-10-10 VITALS
HEIGHT: 73 IN | BODY MASS INDEX: 15.26 KG/M2 | SYSTOLIC BLOOD PRESSURE: 105 MMHG | RESPIRATION RATE: 16 BRPM | HEART RATE: 81 BPM | DIASTOLIC BLOOD PRESSURE: 64 MMHG | OXYGEN SATURATION: 95 % | TEMPERATURE: 98 F

## 2024-10-10 DIAGNOSIS — T45.1X5A ANTINEOPLASTIC CHEMOTHERAPY INDUCED ANEMIA: ICD-10-CM

## 2024-10-10 DIAGNOSIS — D64.81 ANTINEOPLASTIC CHEMOTHERAPY INDUCED ANEMIA: ICD-10-CM

## 2024-10-10 DIAGNOSIS — C13.9 HYPOPHARYNGEAL CANCER: Primary | ICD-10-CM

## 2024-10-10 DIAGNOSIS — R11.2 CHEMOTHERAPY-INDUCED NAUSEA AND VOMITING: ICD-10-CM

## 2024-10-10 DIAGNOSIS — T45.1X5A CHEMOTHERAPY-INDUCED NAUSEA AND VOMITING: ICD-10-CM

## 2024-10-10 DIAGNOSIS — R11.2 CINV (CHEMOTHERAPY-INDUCED NAUSEA AND VOMITING): ICD-10-CM

## 2024-10-10 DIAGNOSIS — C22.0 HEPATOCELLULAR CARCINOMA: ICD-10-CM

## 2024-10-10 DIAGNOSIS — I10 ESSENTIAL (PRIMARY) HYPERTENSION: ICD-10-CM

## 2024-10-10 DIAGNOSIS — T45.1X5A CINV (CHEMOTHERAPY-INDUCED NAUSEA AND VOMITING): ICD-10-CM

## 2024-10-10 DIAGNOSIS — Z93.0 TRACHEOSTOMY IN PLACE: ICD-10-CM

## 2024-10-10 DIAGNOSIS — D50.0 ANEMIA DUE TO CHRONIC BLOOD LOSS: ICD-10-CM

## 2024-10-10 DIAGNOSIS — E43 SEVERE PROTEIN-CALORIE MALNUTRITION: ICD-10-CM

## 2024-10-10 PROCEDURE — G2211 COMPLEX E/M VISIT ADD ON: HCPCS | Mod: S$PBB,,, | Performed by: NURSE PRACTITIONER

## 2024-10-10 PROCEDURE — 4010F ACE/ARB THERAPY RXD/TAKEN: CPT | Mod: CPTII,,, | Performed by: NURSE PRACTITIONER

## 2024-10-10 PROCEDURE — 3008F BODY MASS INDEX DOCD: CPT | Mod: CPTII,,, | Performed by: NURSE PRACTITIONER

## 2024-10-10 PROCEDURE — 3078F DIAST BP <80 MM HG: CPT | Mod: CPTII,,, | Performed by: NURSE PRACTITIONER

## 2024-10-10 PROCEDURE — 1159F MED LIST DOCD IN RCRD: CPT | Mod: CPTII,,, | Performed by: NURSE PRACTITIONER

## 2024-10-10 PROCEDURE — 3044F HG A1C LEVEL LT 7.0%: CPT | Mod: CPTII,,, | Performed by: NURSE PRACTITIONER

## 2024-10-10 PROCEDURE — 3074F SYST BP LT 130 MM HG: CPT | Mod: CPTII,,, | Performed by: NURSE PRACTITIONER

## 2024-10-10 PROCEDURE — 99215 OFFICE O/P EST HI 40 MIN: CPT | Mod: S$PBB,,, | Performed by: NURSE PRACTITIONER

## 2024-10-10 RX ORDER — PROCHLORPERAZINE MALEATE 10 MG
10 TABLET ORAL EVERY 8 HOURS PRN
Qty: 30 TABLET | Refills: 1 | Status: SHIPPED | OUTPATIENT
Start: 2024-10-10 | End: 2025-10-10

## 2024-10-10 RX ORDER — SODIUM CHLORIDE 0.9 % (FLUSH) 0.9 %
10 SYRINGE (ML) INJECTION
Status: CANCELLED | OUTPATIENT
Start: 2024-10-10

## 2024-10-10 RX ORDER — HEPARIN 100 UNIT/ML
500 SYRINGE INTRAVENOUS
Status: CANCELLED | OUTPATIENT
Start: 2024-10-10

## 2024-10-10 RX ORDER — HEPARIN 100 UNIT/ML
500 SYRINGE INTRAVENOUS
OUTPATIENT
Start: 2024-10-10

## 2024-10-10 RX ORDER — SODIUM CHLORIDE 0.9 % (FLUSH) 0.9 %
10 SYRINGE (ML) INJECTION
OUTPATIENT
Start: 2024-10-10

## 2024-10-10 RX ORDER — DIPHENHYDRAMINE HYDROCHLORIDE 50 MG/ML
50 INJECTION INTRAMUSCULAR; INTRAVENOUS ONCE AS NEEDED
OUTPATIENT
Start: 2024-10-10

## 2024-10-10 RX ORDER — EPINEPHRINE 0.3 MG/.3ML
0.3 INJECTION SUBCUTANEOUS ONCE AS NEEDED
OUTPATIENT
Start: 2024-10-10

## 2024-10-10 NOTE — PROGRESS NOTES
MEDICAL ONCOLOGY INITIAL CONSULTATION NOTE    Patient ID: Wallace D Toussaint is a 59 y.o. male.    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  He has trach in place and has been evaluated recently by radiation oncology for concurrent chemoradiation.  He presents to our clinic today for further evaluation.  He has yet to see the dentist for dental clearance prior to starting radiation treatment.  He is accompanied by his brother Pierce as patient no showed previously on 3 different occasions.         Imaging:     PET scan:   No evidence of distant metastatic disease    Past Medical History:   Diagnosis Date    Alcoholic cirrhosis of liver without ascites 06/04/2024    Last Assessment & Plan:    Formatting of this note might be different from the original.   History & Physical patient reports history of cirrhosis currently appears to be on lactulose.  Complicated by possible hepatocellular carcinoma although patient thinks this was a misdiagnosis.  Currently compensated   -Will resume lactulose when able to swallow      Discharge Summary       Follow-up      Cancer of hypopharynx     Patient was diagnosed in June but not sure of the exact date.    Essential (primary) hypertension 06/04/2024    Last Assessment & Plan:    Formatting of this note might be different from the original.   History & Physical well-controlled on admission at 125/65.   Patient on Entresto and metoprolol which we will hold for now until cleared by speech      Discharge Summary blood pressure was well-controlled despite holding his home medications.  Will continue to hold upon discharge and can follow-up with PCP f    History of subdural hematoma 06/04/2024    Last Assessment &  Plan:    Formatting of this note might be different from the original.   History & Physical per chart review.  Patient did not mention this in history      Discharge Summary       Follow-up      Liver cancer     Patient is not sure of the exact date he was diagnosed with this cancer.    Seizure disorder 2024    Last Assessment & Plan:    Formatting of this note might be different from the original.   History & Physical patient reports no seizure in the past 3 years.  Also does not follow with neurology.  Has been on Keppra prescribed by PCP.   -Will continue IV Keppra for now and transition to p.o. when able      Discharge Summary no seizure activity during the hospital stay.  Keppra resumed upon dischar     Family History   Problem Relation Name Age of Onset    Cancer Mother      Stroke Father      Hypertension Father      Hypertension Brother Patrick Toussaint      Social History     Socioeconomic History    Marital status:     Number of children: 0   Tobacco Use    Smoking status: Former     Current packs/day: 0.00     Average packs/day: 1 pack/day for 40.0 years (40.0 ttl pk-yrs)     Types: Cigarettes     Start date:      Quit date: 2020     Years since quittin.7    Smokeless tobacco: Former   Substance and Sexual Activity    Alcohol use: Not Currently    Drug use: Not Currently     Types: Marijuana     Social Drivers of Health     Food Insecurity: No Food Insecurity (2024)    Received from [employer] [facility] and Its Subsidiaries and Affiliates    Hunger Vital Sign     Worried About Running Out of Food in the Last Year: Never true     Ran Out of Food in the Last Year: Never true   Transportation Needs: No Transportation Needs (2024)    Received from [employer] [facility] Subsidiaries and Affiliates    PRAPARE - Transportation     Lack of Transportation (Medical): No     Lack of Transportation (Non-Medical): No      Past Surgical History:   Procedure Laterality Date    APPENDECTOMY      Patient is not sure of the exact date but this was done when he was a kid.    TRACHEOTOMY  06/2024    Patient is not sure of the exact date but it was done in june 2024         Review of systems:  Review of Systems   Constitutional:  Positive for activity change. Negative for appetite change, chills, diaphoresis, fatigue and unexpected weight change.   HENT:  Positive for trouble swallowing and voice change. Negative for congestion, facial swelling, hearing loss and mouth sores.    Eyes:  Negative for photophobia, pain, discharge and itching.   Respiratory:  Negative for apnea, cough, choking, chest tightness and shortness of breath.    Cardiovascular:  Negative for chest pain, palpitations and leg swelling.   Gastrointestinal:  Negative for abdominal distention, abdominal pain, anal bleeding and blood in stool.   Endocrine: Negative for cold intolerance, heat intolerance, polydipsia and polyphagia.   Genitourinary:  Negative for difficulty urinating, dysuria, flank pain and hematuria.   Musculoskeletal:  Negative for arthralgias, back pain, joint swelling, myalgias, neck pain and neck stiffness.   Skin:  Negative for color change, pallor and wound.   Allergic/Immunologic: Negative for environmental allergies, food allergies and immunocompromised state.   Neurological:  Negative for dizziness, seizures, facial asymmetry, speech difficulty, light-headedness, numbness and headaches.   Hematological:  Negative for adenopathy. Does not bruise/bleed easily.   Psychiatric/Behavioral:  Negative for agitation, behavioral problems, confusion, decreased concentration and sleep disturbance.               Physical Exam  Vitals and nursing note reviewed.   Constitutional:       General: He is not in acute distress.     Appearance: Normal appearance. He is not ill-appearing.   HENT:      Head: Normocephalic and atraumatic.      Nose: No congestion or  rhinorrhea.   Eyes:      General: No scleral icterus.     Extraocular Movements: Extraocular movements intact.      Pupils: Pupils are equal, round, and reactive to light.   Neck:      Comments: +ve for trach in place  Cardiovascular:      Rate and Rhythm: Normal rate and regular rhythm.      Pulses: Normal pulses.      Heart sounds: Normal heart sounds. No murmur heard.     No gallop.   Pulmonary:      Effort: Pulmonary effort is normal. No respiratory distress.      Breath sounds: Normal breath sounds. No stridor. No wheezing or rhonchi.   Abdominal:      General: Bowel sounds are normal. There is no distension.      Palpations: There is no mass.      Tenderness: There is no abdominal tenderness. There is no guarding.   Musculoskeletal:         General: No swelling, tenderness, deformity or signs of injury. Normal range of motion.      Cervical back: Normal range of motion and neck supple. No rigidity. No muscular tenderness.      Right lower leg: No edema.      Left lower leg: No edema.   Skin:     General: Skin is warm.      Coloration: Skin is not jaundiced or pale.      Findings: No bruising or lesion.   Neurological:      General: No focal deficit present.      Mental Status: He is alert and oriented to person, place, and time.      Cranial Nerves: No cranial nerve deficit.      Sensory: No sensory deficit.      Motor: No weakness.      Gait: Gait normal.   Psychiatric:         Mood and Affect: Mood normal.         Behavior: Behavior normal.         Thought Content: Thought content normal.                 Vitals:    10/10/24 1022   BP: 105/64   Pulse: 81   Resp: 16   Temp: 97.7 °F (36.5 °C)       Body surface area is 1.64 meters squared.    Assessment/Plan:      ECOG 1    Squamous cell carcinoma arising from the hypopharynx:    == [id number], Stage GAVIN. P16 negative, PDL1 4%  == PET scan showed no evidence of distant metastatic disease.   == Recommendation per NCCN guidelines would be for high dose concurrent  chemo-XRT. I will send PA for port placement and chemotherapy with weekly Cisplatin 40 mg/m2 to be given with radiation.  ==08/20/2024: audiogram ordered for cisplatin, pt had teeth removed and peg has been placed.  Patient here today to discuss upcoming chemotherapy/immunotherapy. An extensive discussion was had which included a thorough discussion of potential side effect profile, risk versus benefits, and expected outcomes.  Risks, including but not limited to, possible hair loss, bone marrow damage, damage to body organs, allergic reactions, sterility, nausea/vomiting, constipation/diarrhea, sores in the mouth, secondary cancers, and rarely death were all discuss.  Specific side effects pertaining to their chemotherapy/immunotherapy medications were discussed as well.  The patient agrees with the plan and all questions and their support systems questions have been answered to their satisfaction.  Premedications were prescribed and patient was educated on appropriate usage.  Patient was educated on when to call, and given the number to call, or to notify the provider including but not limited to:  Persistent nausea and vomiting, dehydration, fever greater than 100.4 lasting over 1 hour induration or isolated feeders greater than 101, rash while on active chemotherapy or immunotherapy, severe pain or new onset pain not controlled by current pain medication regimen.  ==09/09/2024: Patient started XRT, did not get labs, drawn today approx 11am, will not be resulted in time to start cisplatin today.  Audiogram reviewed, grossly normal hearing.  Pt is on lactulose, requests refill, will send refill. Educated to hold if diarrhea.  Labs resulted after visit and reviewed, SrCr wnl, 0.84, Magnesium wnl, 1.9. Pt is cleared to start chemotherapy on 9/10/2024.  ==09/19/2024: Pt had first chemotherapy on 9/12/24, tolerated well, no nausea.  Trach and peg tube in place functioning well. No complaints, labs reviewed, pt cleared  for chemotherapy.  ==10/03/2024: Still having some nausea with chemotherapy relieved with ondansetron, no other complaints. Mg 1.6, pt will receive mg in pre and post hydration fluids with cisplatin.  Labs reviewed pt is cleared for chemotherapy.  Visible improvement noted to right neck mass  ==10/10/2024: Having some nausea despite ondansetron, will start compazine. Pt complains of fatigue, activity intolerance, continued anemia, iron studies reviewed and are low, pt unable to swallow or tolerate po iron, will send iv iron for approval        2. H/O Hepatocellular Carcinoma:    == Reported by patient and on his notes from PCP  == Do not have any prior treatment records at this time. He reports being followed in [place]. Will obtain those treatment records from 2020    3. Hypertension  ==continue to follow up with pcp    4. Anemia  ==Iron deficient  IV iron for approval      5. Cancer cachexia  Continues to use % with 5-6 cans of nutritional supplement         Plan:  IV iron once approved  Continue weekly Cisplatin concurrent with XRT  Cbc cmp mg weekly  Rtc 1 week np visit  Cbc cmp  prior to next appt    Future Appointments   Date Time Provider Department Center   10/10/2024 10:40 AM Reva Giordano NP [facility]   10/16/2024  9:45 AM Daniel Resendiz MD [facility]          Total time spent in counseling and discussion about further management options including relevant lab work, treatment,  prognosis, medications and intended side effects was more than 40 minutes. More than 50% of the time was spent on counseling and coordination of care.  I spent a total of 40 minutes on the day of the visit.This includes face to face time and non-face to face time preparing to see the patient (eg, review of tests), Obtaining and/or reviewing separately obtained history, Documenting clinical information in the electronic or other health record, Independently interpreting resultsand  communicating results to the patient/family/caregiver, or Care coordination.

## 2024-10-16 ENCOUNTER — OFFICE VISIT (OUTPATIENT)
Dept: PRIMARY CARE CLINIC | Facility: CLINIC | Age: 59
End: 2024-10-16
Payer: MEDICARE

## 2024-10-16 VITALS
SYSTOLIC BLOOD PRESSURE: 145 MMHG | TEMPERATURE: 98 F | HEART RATE: 81 BPM | DIASTOLIC BLOOD PRESSURE: 85 MMHG | OXYGEN SATURATION: 95 % | WEIGHT: 116.81 LBS | BODY MASS INDEX: 15.48 KG/M2 | HEIGHT: 73 IN | RESPIRATION RATE: 16 BRPM

## 2024-10-16 DIAGNOSIS — I10 ESSENTIAL (PRIMARY) HYPERTENSION: ICD-10-CM

## 2024-10-16 DIAGNOSIS — C32.9 LARYNGEAL CANCER: ICD-10-CM

## 2024-10-16 DIAGNOSIS — K59.00 CONSTIPATION, UNSPECIFIED CONSTIPATION TYPE: ICD-10-CM

## 2024-10-16 DIAGNOSIS — C22.0 HEPATOCELLULAR CARCINOMA: Primary | ICD-10-CM

## 2024-10-16 LAB
ALBUMIN SERPL BCP-MCNC: 2.1 G/DL (ref 3.4–5)
ALBUMIN/GLOBULIN RATIO: 0.47 RATIO (ref 1.1–1.8)
ALP SERPL-CCNC: 197 U/L (ref 46–116)
ALT SERPL W P-5'-P-CCNC: 28 U/L (ref 12–78)
ANION GAP SERPL CALC-SCNC: 9 MMOL/L (ref 3–11)
AST SERPL-CCNC: 31 U/L (ref 15–37)
BANDS: 2 % (ref 0–5)
BILIRUB SERPL-MCNC: 0.4 MG/DL (ref 0–1)
BUN SERPL-MCNC: 21 MG/DL (ref 7–18)
BUN/CREAT SERPL: 41.17 RATIO (ref 7–18)
CALCIUM SERPL-MCNC: 8.8 MG/DL (ref 8.8–10.5)
CELLS COUNTED: 100
CHLORIDE SERPL-SCNC: 99 MMOL/L (ref 100–108)
CO2 SERPL-SCNC: 23 MMOL/L (ref 21–32)
CREAT SERPL-MCNC: 0.51 MG/DL (ref 0.7–1.3)
ERYTHROCYTE [DISTWIDTH] IN BLOOD BY AUTOMATED COUNT: 19.5 % (ref 12.5–18)
GFR ESTIMATION: > 60
GLOBULIN: 4.5 G/DL (ref 2.3–3.5)
GLUCOSE SERPL-MCNC: 138 MG/DL (ref 70–110)
HCT VFR BLD AUTO: 32.8 % (ref 42–52)
HGB BLD-MCNC: 10.4 G/DL (ref 14–18)
LYMPHOCYTES NFR BLD: 5 % (ref 25–40)
MAGNESIUM SERPL-MCNC: 1.5 MG/DL (ref 1.8–2.4)
MCH RBC QN AUTO: 30 PG (ref 27–31.2)
MCHC RBC AUTO-ENTMCNC: 31.7 G/DL (ref 31.8–35.4)
MCV RBC AUTO: 94.5 FL (ref 80–97)
MONOCYTES NFR BLD: 14 % (ref 1–15)
NEUTROPHILS # BLD AUTO: 2.3 10*3/UL (ref 1.8–7.7)
NEUTROPHILS NFR BLD: 79 % (ref 37–80)
NUCLEATED RED BLOOD CELLS: 0 %
PLATELETS: 98 10*3/UL (ref 142–424)
POTASSIUM SERPL-SCNC: 5.1 MMOL/L (ref 3.6–5.2)
PROT SERPL-MCNC: 6.6 G/DL (ref 6.4–8.2)
RBC # BLD AUTO: 3.47 10*6/UL (ref 4.7–6.1)
RBC MORPH BLD: ABNORMAL
SMALL PLATELETS BLD QL SMEAR: ABNORMAL
SODIUM BLD-SCNC: 131 MMOL/L (ref 135–145)
WBC # BLD: 2.8 10*3/UL (ref 4.6–10.2)

## 2024-10-16 PROCEDURE — 4010F ACE/ARB THERAPY RXD/TAKEN: CPT | Mod: CPTII,,, | Performed by: INTERNAL MEDICINE

## 2024-10-16 PROCEDURE — 3008F BODY MASS INDEX DOCD: CPT | Mod: CPTII,,, | Performed by: INTERNAL MEDICINE

## 2024-10-16 PROCEDURE — 3044F HG A1C LEVEL LT 7.0%: CPT | Mod: CPTII,,, | Performed by: INTERNAL MEDICINE

## 2024-10-16 PROCEDURE — 1160F RVW MEDS BY RX/DR IN RCRD: CPT | Mod: CPTII,,, | Performed by: INTERNAL MEDICINE

## 2024-10-16 PROCEDURE — 99214 OFFICE O/P EST MOD 30 MIN: CPT | Mod: S$PBB,,, | Performed by: INTERNAL MEDICINE

## 2024-10-16 PROCEDURE — 1159F MED LIST DOCD IN RCRD: CPT | Mod: CPTII,,, | Performed by: INTERNAL MEDICINE

## 2024-10-16 PROCEDURE — 3079F DIAST BP 80-89 MM HG: CPT | Mod: CPTII,,, | Performed by: INTERNAL MEDICINE

## 2024-10-16 PROCEDURE — 3077F SYST BP >= 140 MM HG: CPT | Mod: CPTII,,, | Performed by: INTERNAL MEDICINE

## 2024-10-16 RX ORDER — ACETYLCYSTEINE 200 MG/ML
4 SOLUTION ORAL; RESPIRATORY (INHALATION) EVERY 4 HOURS
Qty: 720 ML | Refills: 0 | Status: SHIPPED | OUTPATIENT
Start: 2024-10-16 | End: 2024-11-15

## 2024-10-16 NOTE — PROGRESS NOTES
Subjective:      Patient ID: Wallace D Toussaint is a 59 y.o. male.    Chief Complaint: Hypertension (2month f/u ) and Cough (C/o cough)    : Patient with history of seizures and is on Keppra.  Patient reports seizures are under control with medication and last seizure was three years ago.  Patient also has history of alcohol dependence, alcoholic liver cirrhosis and quit drinking 4 years ago.  Patient last AST ALT were normal but albumin was really low.  Patient has questionable history of hepatocellular carcinoma as well that was never treated and patient had PET scan according to Hematology note and that was negative.    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  Patient is now being followed by Dr. Gallagher/oncology.  Patient is undergoing chemo and radiation therapy and tolerating the treatment well.     Patient has congestive heart failure with last echocardiogram showed EF of 50-65% with normal diastolic dysfunction.  Patient has questionable atrial fibrillation as well. (documented in discharge summary but patient denies the diagnosis).  Patient blood pressure seem to be running high.  Home blood pressures are not being monitored but blood pressure in other facilities for radiation and chemo have been under good control.  Patient today reports large amount of mucus production.  Patient reports some mild cough and wheezing as well.  No fever or chill.     Review of Systems   Constitutional:  Negative for chills, diaphoresis, fever, malaise/fatigue and weight loss.   HENT:  Negative for congestion, ear pain, sinus pain, sore throat and tinnitus.    Eyes:  Negative for blurred vision and photophobia.   Respiratory:  Positive for cough. Negative for hemoptysis, shortness of breath and wheezing.    Cardiovascular:  Negative for chest pain, palpitations, orthopnea, leg swelling and PND.   Gastrointestinal:  Negative for abdominal pain,  "blood in stool, constipation, diarrhea, heartburn, melena, nausea and vomiting.   Genitourinary:  Negative for dysuria, frequency and urgency.   Musculoskeletal:  Negative for back pain, myalgias and neck pain.   Skin:  Negative for rash.   Neurological:  Negative for dizziness, tremors, seizures, loss of consciousness and weakness.   Endo/Heme/Allergies:  Negative for polydipsia.   Psychiatric/Behavioral:  Negative for depression and hallucinations. The patient does not have insomnia.      Objective:   BP (!) 145/85 (BP Location: Right arm, Patient Position: Sitting)   Pulse 81   Temp 97.9 °F (36.6 °C) (Oral)   Resp 16   Ht 6' 1" (1.854 m)   Wt 53 kg (116 lb 12.8 oz)   SpO2 95%   BMI 15.41 kg/m²     Physical Exam  Constitutional:       General: He is not in acute distress.     Appearance: He is ill-appearing. He is not diaphoretic.      Comments: Cachectic   Cardiovascular:      Rate and Rhythm: Normal rate and regular rhythm.      Heart sounds: Normal heart sounds. No murmur heard.  Pulmonary:      Effort: Pulmonary effort is normal. No respiratory distress.      Breath sounds: Normal breath sounds. No wheezing.      Comments: Tracheostomy tube is placed.  Patient is not able to speak  Abdominal:      General: Bowel sounds are normal. There is no distension.      Palpations: Abdomen is soft.      Tenderness: There is no abdominal tenderness.      Comments: PEG tube is placed and insertion site has no erythema warmth or oozing   Neurological:      Mental Status: He is alert.   Psychiatric:         Behavior: Behavior normal.         Thought Content: Thought content normal.         Judgment: Judgment normal.       Assessment:       ICD-10-CM ICD-9-CM   1. Hepatocellular carcinoma  C22.0 155.0   2. Laryngeal cancer  C32.9 161.9   3. Essential (primary) hypertension  I10 401.9   4. Constipation, unspecified constipation type  K59.00 564.00       Plan:     Patient with laryngeal cancer s/p tracheostomy  Patient " is under care of Hematology/Oncology Dr. Gallagher.   Patient is receiving chemo and radiation and tolerating the treatment well.  Patient reports lot of mucoid discharge production making it difficult for him to breathe  Will use mucolytics  Patient has hypertension and blood pressure seem to be running high  Blood pressure in other facilities have been under good control.  Will not change medication  Patient has questionable history of hepatocellular carcinoma and liver cirrhosis  Patient liver enzymes are normal  Albumin is low but that can be secondary to malnutrition  Will get ultrasound abdomen and will check PT PTT  Patient also reports having bowel movement every other day..  Advised patient to increase lactulose to have 2-3 loose bowel movements a day  Patient is being followed by nutritionist and is receiving nutrition via PEG tube.  Patient has gained 6 lb..  Patient is encouraged to continue feeds as recommended by nutrition    Medication List with Changes/Refills   New Medications    ACETYLCYSTEINE 200 MG/ML, 20%, (MUCOMYST) 200 MG/ML (20 %) NEBULIZER SOLUTION    Take 4 mLs by nebulization every 4 (four) hours.   Current Medications    ENTRESTO 24-26 MG PER TABLET    Take 1 tablet by mouth 2 (two) times daily.    FAMOTIDINE (PEPCID) 20 MG TABLET    Take 20 mg by mouth 2 (two) times daily.    LACTULOSE (CHRONULAC) 20 GRAM/30 ML SOLN    15 mLs (10 g total) by Per G Tube route 2 (two) times daily.    LEVETIRACETAM (KEPPRA) 100 MG/ML SOLN    1,000 mg by FEEDING TUBE route 2 (two) times daily.    METOPROLOL TARTRATE (LOPRESSOR) 25 MG TABLET    25 mg by FEEDING TUBE route once daily.    ONDANSETRON (ZOFRAN) 8 MG TABLET    Take 1 tablet (8 mg total) by mouth every 8 (eight) hours as needed for Nausea.    PROCHLORPERAZINE (COMPAZINE) 10 MG TABLET    Take 1 tablet (10 mg total) by mouth every 8 (eight) hours as needed (nausea).   Discontinued Medications    CEFDINIR (OMNICEF) 250 MG/5 ML SUSPENSION    Take 250 mg  by mouth once daily. Take 250mg/5mg by mouth daily

## 2024-10-17 ENCOUNTER — OFFICE VISIT (OUTPATIENT)
Dept: HEMATOLOGY/ONCOLOGY | Facility: CLINIC | Age: 59
End: 2024-10-17
Payer: MEDICARE

## 2024-10-17 VITALS
OXYGEN SATURATION: 92 % | DIASTOLIC BLOOD PRESSURE: 71 MMHG | WEIGHT: 113.19 LBS | HEART RATE: 78 BPM | BODY MASS INDEX: 15 KG/M2 | SYSTOLIC BLOOD PRESSURE: 111 MMHG | TEMPERATURE: 98 F | HEIGHT: 73 IN | RESPIRATION RATE: 16 BRPM

## 2024-10-17 DIAGNOSIS — C22.0 HEPATOCELLULAR CARCINOMA: ICD-10-CM

## 2024-10-17 DIAGNOSIS — T45.1X5A ANTINEOPLASTIC CHEMOTHERAPY INDUCED ANEMIA: ICD-10-CM

## 2024-10-17 DIAGNOSIS — T45.1X5A CINV (CHEMOTHERAPY-INDUCED NAUSEA AND VOMITING): ICD-10-CM

## 2024-10-17 DIAGNOSIS — T45.1X5A CHEMOTHERAPY-INDUCED NAUSEA AND VOMITING: Primary | ICD-10-CM

## 2024-10-17 DIAGNOSIS — Z93.0 TRACHEOSTOMY IN PLACE: ICD-10-CM

## 2024-10-17 DIAGNOSIS — E43 SEVERE PROTEIN-CALORIE MALNUTRITION: ICD-10-CM

## 2024-10-17 DIAGNOSIS — R11.2 CINV (CHEMOTHERAPY-INDUCED NAUSEA AND VOMITING): ICD-10-CM

## 2024-10-17 DIAGNOSIS — C13.9 HYPOPHARYNGEAL CANCER: ICD-10-CM

## 2024-10-17 DIAGNOSIS — D64.81 ANTINEOPLASTIC CHEMOTHERAPY INDUCED ANEMIA: ICD-10-CM

## 2024-10-17 DIAGNOSIS — I10 ESSENTIAL (PRIMARY) HYPERTENSION: ICD-10-CM

## 2024-10-17 DIAGNOSIS — R11.2 CHEMOTHERAPY-INDUCED NAUSEA AND VOMITING: Primary | ICD-10-CM

## 2024-10-17 PROCEDURE — 4010F ACE/ARB THERAPY RXD/TAKEN: CPT | Mod: CPTII,,, | Performed by: NURSE PRACTITIONER

## 2024-10-17 PROCEDURE — 3008F BODY MASS INDEX DOCD: CPT | Mod: CPTII,,, | Performed by: NURSE PRACTITIONER

## 2024-10-17 PROCEDURE — 3074F SYST BP LT 130 MM HG: CPT | Mod: CPTII,,, | Performed by: NURSE PRACTITIONER

## 2024-10-17 PROCEDURE — 99215 OFFICE O/P EST HI 40 MIN: CPT | Mod: S$PBB,,, | Performed by: NURSE PRACTITIONER

## 2024-10-17 PROCEDURE — 3044F HG A1C LEVEL LT 7.0%: CPT | Mod: CPTII,,, | Performed by: NURSE PRACTITIONER

## 2024-10-17 PROCEDURE — 3078F DIAST BP <80 MM HG: CPT | Mod: CPTII,,, | Performed by: NURSE PRACTITIONER

## 2024-10-17 PROCEDURE — 1159F MED LIST DOCD IN RCRD: CPT | Mod: CPTII,,, | Performed by: NURSE PRACTITIONER

## 2024-10-17 PROCEDURE — G2211 COMPLEX E/M VISIT ADD ON: HCPCS | Mod: S$PBB,,, | Performed by: NURSE PRACTITIONER

## 2024-10-17 RX ORDER — HEPARIN 100 UNIT/ML
500 SYRINGE INTRAVENOUS
Status: CANCELLED | OUTPATIENT
Start: 2024-10-17

## 2024-10-17 RX ORDER — SODIUM CHLORIDE 0.9 % (FLUSH) 0.9 %
10 SYRINGE (ML) INJECTION
OUTPATIENT
Start: 2024-10-17

## 2024-10-17 RX ORDER — SODIUM CHLORIDE 0.9 % (FLUSH) 0.9 %
10 SYRINGE (ML) INJECTION
Status: CANCELLED | OUTPATIENT
Start: 2024-10-17

## 2024-10-17 RX ORDER — HEPARIN 100 UNIT/ML
500 SYRINGE INTRAVENOUS
OUTPATIENT
Start: 2024-10-17

## 2024-10-17 RX ORDER — DIPHENHYDRAMINE HYDROCHLORIDE 50 MG/ML
50 INJECTION INTRAMUSCULAR; INTRAVENOUS ONCE AS NEEDED
OUTPATIENT
Start: 2024-10-17

## 2024-10-17 RX ORDER — EPINEPHRINE 0.3 MG/.3ML
0.3 INJECTION SUBCUTANEOUS ONCE AS NEEDED
OUTPATIENT
Start: 2024-10-17

## 2024-10-17 NOTE — PROGRESS NOTES
MEDICAL ONCOLOGY INITIAL CONSULTATION NOTE    Patient ID: Wallace D Toussaint is a 59 y.o. male.    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  He has trach in place and has been evaluated recently by radiation oncology for concurrent chemoradiation.  He presents to our clinic today for further evaluation.  He has yet to see the dentist for dental clearance prior to starting radiation treatment.  He is accompanied by his brother Pierce as patient no showed previously on 3 different occasions.         Imaging:     PET scan:   No evidence of distant metastatic disease    Past Medical History:   Diagnosis Date    Alcoholic cirrhosis of liver without ascites 06/04/2024    Last Assessment & Plan:    Formatting of this note might be different from the original.   History & Physical patient reports history of cirrhosis currently appears to be on lactulose.  Complicated by possible hepatocellular carcinoma although patient thinks this was a misdiagnosis.  Currently compensated   -Will resume lactulose when able to swallow      Discharge Summary       Follow-up      Cancer of hypopharynx     Patient was diagnosed in June but not sure of the exact date.    Essential (primary) hypertension 06/04/2024    Last Assessment & Plan:    Formatting of this note might be different from the original.   History & Physical well-controlled on admission at 125/65.   Patient on Entresto and metoprolol which we will hold for now until cleared by speech      Discharge Summary blood pressure was well-controlled despite holding his home medications.  Will continue to hold upon discharge and can follow-up with PCP f    History of subdural hematoma 06/04/2024    Last Assessment &  Plan:    Formatting of this note might be different from the original.   History & Physical per chart review.  Patient did not mention this in history      Discharge Summary       Follow-up      Liver cancer     Patient is not sure of the exact date he was diagnosed with this cancer.    Seizure disorder 2024    Last Assessment & Plan:    Formatting of this note might be different from the original.   History & Physical patient reports no seizure in the past 3 years.  Also does not follow with neurology.  Has been on Keppra prescribed by PCP.   -Will continue IV Keppra for now and transition to p.o. when able      Discharge Summary no seizure activity during the hospital stay.  Keppra resumed upon dischar     Family History   Problem Relation Name Age of Onset    Cancer Mother      Stroke Father      Hypertension Father      Hypertension Brother Patrick Toussaint      Social History     Socioeconomic History    Marital status:     Number of children: 0   Tobacco Use    Smoking status: Former     Current packs/day: 0.00     Average packs/day: 1 pack/day for 40.0 years (40.0 ttl pk-yrs)     Types: Cigarettes     Start date:      Quit date: 2020     Years since quittin.7    Smokeless tobacco: Former   Substance and Sexual Activity    Alcohol use: Not Currently    Drug use: Not Currently     Types: Marijuana     Social Drivers of Health     Food Insecurity: No Food Insecurity (2024)    Received from [employer] [facility]    Hunger Vital Sign     Worried About Running Out of Food in the Last Year: Never true     Ran Out of Food in the Last Year: Never true   Transportation Needs: No Transportation Needs (2024)    Received from [employer] [facility]    PRAPARE - Transportation     Lack of Transportation (Medical): No     Lack of Transportation (Non-Medical): No      Past Surgical History:   Procedure Laterality Date    APPENDECTOMY      Patient is not sure of the exact date but this was done when he was a kid.    TRACHEOTOMY  06/2024    Patient is not sure of the exact date but it was done in june 2024         Review of systems:  Review of Systems   Constitutional:  Positive for activity change. Negative for appetite change, chills, diaphoresis, fatigue and unexpected weight change.   HENT:  Positive for trouble swallowing and voice change. Negative for congestion, facial swelling, hearing loss and mouth sores.    Eyes:  Negative for photophobia, pain, discharge and itching.   Respiratory:  Negative for apnea, cough, choking, chest tightness and shortness of breath.    Cardiovascular:  Negative for chest pain, palpitations and leg swelling.   Gastrointestinal:  Negative for abdominal distention, abdominal pain, anal bleeding and blood in stool.   Endocrine: Negative for cold intolerance, heat intolerance, polydipsia and polyphagia.   Genitourinary:  Negative for difficulty urinating, dysuria, flank pain and hematuria.   Musculoskeletal:  Negative for arthralgias, back pain, joint swelling, myalgias, neck pain and neck stiffness.   Skin:  Negative for color change, pallor and wound.   Allergic/Immunologic: Negative for environmental allergies, food allergies and immunocompromised state.   Neurological:  Negative for dizziness, seizures, facial asymmetry, speech difficulty, light-headedness, numbness and headaches.   Hematological:  Negative for adenopathy. Does not bruise/bleed easily.   Psychiatric/Behavioral:  Negative for agitation, behavioral problems, confusion, decreased concentration and sleep disturbance.               Physical Exam  Vitals and nursing note reviewed.   Constitutional:       General: He is not in acute distress.     Appearance: Normal appearance. He is not ill-appearing.   HENT:      Head: Normocephalic and atraumatic.      Nose: No congestion or  rhinorrhea.   Eyes:      General: No scleral icterus.     Extraocular Movements: Extraocular movements intact.      Pupils: Pupils are equal, round, and reactive to light.   Neck:      Comments: +ve for trach in place  Cardiovascular:      Rate and Rhythm: Normal rate and regular rhythm.      Pulses: Normal pulses.      Heart sounds: Normal heart sounds. No murmur heard.     No gallop.   Pulmonary:      Effort: Pulmonary effort is normal. No respiratory distress.      Breath sounds: Normal breath sounds. No stridor. No wheezing or rhonchi.   Abdominal:      General: Bowel sounds are normal. There is no distension.      Palpations: There is no mass.      Tenderness: There is no abdominal tenderness. There is no guarding.   Musculoskeletal:         General: No swelling, tenderness, deformity or signs of injury. Normal range of motion.      Cervical back: Normal range of motion and neck supple. No rigidity. No muscular tenderness.      Right lower leg: No edema.      Left lower leg: No edema.   Skin:     General: Skin is warm.      Coloration: Skin is not jaundiced or pale.      Findings: No bruising or lesion.   Neurological:      General: No focal deficit present.      Mental Status: He is alert and oriented to person, place, and time.      Cranial Nerves: No cranial nerve deficit.      Sensory: No sensory deficit.      Motor: No weakness.      Gait: Gait normal.   Psychiatric:         Mood and Affect: Mood normal.         Behavior: Behavior normal.         Thought Content: Thought content normal.                 There were no vitals filed for this visit.      There is no height or weight on file to calculate BSA.    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 P16 negative, PDL1 4%  == PET scan showed no evidence of distant metastatic disease.   == Recommendation per NCCN guidelines would be for high dose concurrent chemo-XRT. I will send PA for  port placement and chemotherapy with weekly Cisplatin 40 mg/m2 to be given with radiation.  ==08/20/2024: audiogram ordered for cisplatin, pt had teeth removed and peg has been placed.  Patient here today to discuss upcoming chemotherapy/immunotherapy. An extensive discussion was had which included a thorough discussion of potential side effect profile, risk versus benefits, and expected outcomes.  Risks, including but not limited to, possible hair loss, bone marrow damage, damage to body organs, allergic reactions, sterility, nausea/vomiting, constipation/diarrhea, sores in the mouth, secondary cancers, and rarely death were all discuss.  Specific side effects pertaining to their chemotherapy/immunotherapy medications were discussed as well.  The patient agrees with the plan and all questions and their support systems questions have been answered to their satisfaction.  Premedications were prescribed and patient was educated on appropriate usage.  Patient was educated on when to call, and given the number to call, or to notify the provider including but not limited to:  Persistent nausea and vomiting, dehydration, fever greater than 100.4 lasting over 1 hour induration or isolated feeders greater than 101, rash while on active chemotherapy or immunotherapy, severe pain or new onset pain not controlled by current pain medication regimen.  ==09/09/2024: Patient started XRT, did not get labs, drawn today approx 11am, will not be resulted in time to start cisplatin today.  Audiogram reviewed, grossly normal hearing.  Pt is on lactulose, requests refill, will send refill. Educated to hold if diarrhea.  Labs resulted after visit and reviewed, SrCr wnl, 0.84, Magnesium wnl, 1.9. Pt is cleared to start chemotherapy on 9/10/2024.  ==09/19/2024: Pt had first chemotherapy on 9/12/24, tolerated well, no nausea.  Trach and peg tube in place functioning well. No complaints, labs reviewed, pt cleared for  chemotherapy.  ==10/03/2024: Still having some nausea with chemotherapy relieved with ondansetron, no other complaints. Mg 1.6, pt will receive mg in pre and post hydration fluids with cisplatin.  Labs reviewed pt is cleared for chemotherapy.  Visible improvement noted to right neck mass  ==10/10/2024: Having some nausea despite ondansetron, will start compazine. Pt complains of fatigue, activity intolerance, continued anemia, iron studies reviewed and are low, pt unable to swallow or tolerate po iron, will send iv iron for approval  ==10/17/2024: Pt will complete XRT on 10/25/2024. Coordinated care with XRT and Dr. Barber will order post treatment scans. Pt to receive final cisplatin infusion and start IV iron. Labs reviewed pt cleared for chemotherapy.        2. H/O Hepatocellular Carcinoma:    == Reported by patient and on his notes from PCP  == Do not have any prior treatment records at this time. He reports being followed in [place]. Will obtain those treatment records from 2020    3. Hypertension  ==continue to follow up with pcp    4. Anemia  ==Iron deficient  IV iron for approval      5. Cancer cachexia  Continues to use % with 5-6 cans of nutritional supplement         Plan:  IV iron weekly x 5 - venofer per patient's insurance formulary  RTC 6 weeks - restaging scans per Dr. Barber   [facility]      Future Appointments   Date Time Provider Department Center   10/17/2024 10:40 AM Reva Giordano NP [facility]   12/16/2024  1:00 PM Daniel Resendiz MD [facility]          Total time spent in counseling and discussion about further management options including relevant lab work, treatment,  prognosis, medications and intended side effects was more than 40 minutes. More than 50% of the time was spent on counseling and coordination of care.  I spent a total of 40 minutes on the day of the visit.This includes face to face time and non-face to face time preparing to see the  patient (eg, review of tests), Obtaining and/or reviewing separately obtained history, Documenting clinical information in the electronic or other health record, Independently interpreting resultsand communicating results to the patient/family/caregiver, or Care coordination.

## 2024-11-07 ENCOUNTER — TELEPHONE (OUTPATIENT)
Dept: PRIMARY CARE CLINIC | Facility: CLINIC | Age: 59
End: 2024-11-07
Payer: MEDICARE

## 2024-11-07 DIAGNOSIS — C32.9 LARYNGEAL CANCER: Primary | ICD-10-CM
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
## 2024-11-08 ENCOUNTER — TELEPHONE (OUTPATIENT)
Dept: PRIMARY CARE CLINIC | Facility: CLINIC | Age: 59
End: 2024-11-08
Payer: MEDICARE

## 2024-11-08 ENCOUNTER — TELEPHONE (OUTPATIENT)
Dept: SURGERY | Facility: CLINIC | Age: 59
End: 2024-11-08
Payer: MEDICARE

## 2024-11-08 NOTE — TELEPHONE ENCOUNTER
----- Message from Rupa sent at 11/8/2024 10:23 AM CST -----  Contact: jhonny  Type:  Patient Returning Call    Who Called:beryl  Who Left Message for Patient:unsure  Does the patient know what this is regarding?:feeding tube port  Would the patient rather a call back or a response via [facility]?   Best Call Back Number:[phone]  Additional Information: n/a

## 2024-11-08 NOTE — TELEPHONE ENCOUNTER
----- Message from [employer] sent at 11/8/2024  1:47 PM CST -----  Contact: brother yung  .Type:  Patient Requesting Call    Who Called:brother yung  Does the patient know what this is regarding?:pt  Would the patient rather a call back or a response via [facility]? call  Best Call Back Number:.[phone] (home)     Additional Information:

## 2024-11-08 NOTE — TELEPHONE ENCOUNTER
Patient went in to [facility] , trouble with valve leakage of fluids out of abdomen , ER did not have any ports was advised to reach out to PCP .

## 2024-11-08 NOTE — TELEPHONE ENCOUNTER
Patients brother states that the feeding tube port valve is malfuntioning. They were seen at [facility] and told to contact us. I told him I would check on it and give him a call back.  ----- Message from Rupa sent at 11/8/2024 10:26 AM CST -----  Contact: beryl  Type:  Patient Returning Call    Who Called:beryl  Who Left Message for Patient:unsure  Does the patient know what this is regarding?:feeding tube valve port, malfunctioning  Would the patient rather a call back or a response via [facility]?   Best Call Back Number:[phone]  Additional Information: n/a

## 2024-11-08 NOTE — TELEPHONE ENCOUNTER
Returned call to patient's brother, Mr. Pelayo, he states he s/w you earlier and pt agreed to out of town referral.

## 2024-11-08 NOTE — ADDENDUM NOTE
Addended by: RAQUEL MONTEIRO on: 11/8/2024 12:49 PM     Modules accepted: Orders     CAD w/ remote history of MI and angioplasty, 1989  · Continue Coreg  · Not on aspirin as he takes warfarin chronically  · Statin on hold given transaminitis

## 2024-11-12 ENCOUNTER — TELEPHONE (OUTPATIENT)
Dept: PRIMARY CARE CLINIC | Facility: CLINIC | Age: 59
End: 2024-11-12
Payer: MEDICARE

## 2024-11-12 DIAGNOSIS — K94.23 LEAKING PEG TUBE: ICD-10-CM

## 2024-11-12 DIAGNOSIS — R18.8 CIRRHOSIS OF LIVER WITH ASCITES, UNSPECIFIED HEPATIC CIRRHOSIS TYPE: Primary | ICD-10-CM

## 2024-11-12 DIAGNOSIS — K74.60 CIRRHOSIS OF LIVER WITH ASCITES, UNSPECIFIED HEPATIC CIRRHOSIS TYPE: Primary | ICD-10-CM

## 2024-11-12 NOTE — TELEPHONE ENCOUNTER
----- Message from Lynne sent at 11/12/2024  1:41 PM CST -----  Contact: Kamilla/Sister  Type:  Patient Returning Call    Who Called:Kamilla  Who Left Message for Patient:unknown  Does the patient know what this is regarding?:Medical advice  Would the patient rather a call back or a response via [facility]? call  Best Call Back Number: [phone]   Additional Information: Patient's sister reports patient's brother was contacted regarding patient's care and request assistance.  Thank you,  GH

## 2024-11-13 ENCOUNTER — TELEPHONE (OUTPATIENT)
Dept: PRIMARY CARE CLINIC | Facility: CLINIC | Age: 59
End: 2024-11-13
Payer: MEDICARE

## 2024-11-13 NOTE — TELEPHONE ENCOUNTER
----- Message from Lindsay sent at 11/13/2024  2:09 PM CST -----  Contact: Sister  Patient sister is calling in regards to Nurse Naila was supposed to return her call about some information for the patient. She says that she is still waiting on the call. Call Back is [phone]

## 2024-11-13 NOTE — TELEPHONE ENCOUNTER
Returned call to patient's sister and advised referral is sent to Dr. Hall as urgent, s/w the nurse and they will accommodate patient sooner.

## 2024-11-26 LAB
ALBUMIN SERPL BCP-MCNC: 2.4 G/DL (ref 3.4–5)
ALBUMIN/GLOBULIN RATIO: 0.5 RATIO (ref 1.1–1.8)
ALP SERPL-CCNC: 185 U/L (ref 46–116)
ALT SERPL W P-5'-P-CCNC: 29 U/L (ref 12–78)
ANION GAP SERPL CALC-SCNC: 7 MMOL/L (ref 3–11)
ANISOCYTOSIS: ABNORMAL
AST SERPL-CCNC: 40 U/L (ref 15–37)
BANDS: 1 % (ref 0–5)
BILIRUB SERPL-MCNC: 0.7 MG/DL (ref 0–1)
BUN SERPL-MCNC: 30 MG/DL (ref 7–18)
BUN/CREAT SERPL: 46.15 RATIO (ref 7–18)
CALCIUM SERPL-MCNC: 9 MG/DL (ref 8.8–10.5)
CELLS COUNTED: 100
CHLORIDE SERPL-SCNC: 97 MMOL/L (ref 100–108)
CO2 SERPL-SCNC: 28 MMOL/L (ref 21–32)
CREAT SERPL-MCNC: 0.65 MG/DL (ref 0.7–1.3)
EOSINOPHIL NFR BLD: 1 % (ref 1–3)
ERYTHROCYTE [DISTWIDTH] IN BLOOD BY AUTOMATED COUNT: 18.7 % (ref 12.5–18)
GFR ESTIMATION: 108
GLOBULIN: 4.8 G/DL (ref 2.3–3.5)
GLUCOSE SERPL-MCNC: 119 MG/DL (ref 70–110)
HCT VFR BLD AUTO: 35 % (ref 42–52)
HGB BLD-MCNC: 10.8 G/DL (ref 14–18)
LYMPHOCYTES NFR BLD: 13 % (ref 25–40)
MCH RBC QN AUTO: 31.9 PG (ref 27–31.2)
MCHC RBC AUTO-ENTMCNC: 30.9 G/DL (ref 31.8–35.4)
MCV RBC AUTO: 103.2 FL (ref 80–97)
MONOCYTES NFR BLD: 14 % (ref 1–15)
NEUTROPHILS # BLD AUTO: 3 10*3/UL (ref 1.8–7.7)
NEUTROPHILS NFR BLD: 71 % (ref 37–80)
NUCLEATED RED BLOOD CELLS: 0 %
PLATELETS: 148 10*3/UL (ref 142–424)
POTASSIUM SERPL-SCNC: 4.1 MMOL/L (ref 3.6–5.2)
PROT SERPL-MCNC: 7.2 G/DL (ref 6.4–8.2)
RBC # BLD AUTO: 3.39 10*6/UL (ref 4.7–6.1)
SMALL PLATELETS BLD QL SMEAR: ADEQUATE
SODIUM BLD-SCNC: 132 MMOL/L (ref 135–145)
WBC # BLD: 4.1 10*3/UL (ref 4.6–10.2)

## 2024-11-27 ENCOUNTER — OFFICE VISIT (OUTPATIENT)
Dept: HEMATOLOGY/ONCOLOGY | Facility: CLINIC | Age: 59
End: 2024-11-27
Payer: MEDICARE

## 2024-11-27 VITALS
OXYGEN SATURATION: 93 % | HEART RATE: 78 BPM | HEIGHT: 73 IN | DIASTOLIC BLOOD PRESSURE: 67 MMHG | BODY MASS INDEX: 15.33 KG/M2 | TEMPERATURE: 98 F | WEIGHT: 115.69 LBS | RESPIRATION RATE: 16 BRPM | SYSTOLIC BLOOD PRESSURE: 114 MMHG

## 2024-11-27 DIAGNOSIS — C13.9 HYPOPHARYNGEAL CANCER: Primary | ICD-10-CM

## 2024-11-27 DIAGNOSIS — D69.6 THROMBOCYTOPENIA, UNSPECIFIED: ICD-10-CM

## 2024-11-27 DIAGNOSIS — C78.7 SECONDARY MALIGNANT NEOPLASM OF LIVER AND INTRAHEPATIC BILE DUCT: ICD-10-CM

## 2024-11-27 DIAGNOSIS — Z93.1 GASTROSTOMY STATUS: ICD-10-CM

## 2024-11-27 NOTE — PROGRESS NOTES
MEDICAL ONCOLOGY FOLLOW UP CONSULTATION NOTE    Patient ID: Wallace D Toussaint is a 59 y.o. male.    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  He has trach in place and has been evaluated recently by radiation oncology for concurrent chemoradiation.  He presents to our clinic today for further evaluation.  He has yet to see the dentist for dental clearance prior to starting radiation treatment.  He is accompanied by his brother Pierce as patient no showed previously on 3 different occasions.         Imaging:     PET scan:   No evidence of distant metastatic disease    Past Medical History:   Diagnosis Date    Alcoholic cirrhosis of liver without ascites 06/04/2024    Last Assessment & Plan:    Formatting of this note might be different from the original.   History & Physical patient reports history of cirrhosis currently appears to be on lactulose.  Complicated by possible hepatocellular carcinoma although patient thinks this was a misdiagnosis.  Currently compensated   -Will resume lactulose when able to swallow      Discharge Summary       Follow-up      Cancer of hypopharynx     Patient was diagnosed in June but not sure of the exact date.    Essential (primary) hypertension 06/04/2024    Last Assessment & Plan:    Formatting of this note might be different from the original.   History & Physical well-controlled on admission at 125/65.   Patient on Entresto and metoprolol which we will hold for now until cleared by speech      Discharge Summary blood pressure was well-controlled despite holding his home medications.  Will continue to hold upon discharge and can follow-up with PCP f    History of subdural hematoma 06/04/2024    Last Assessment &  Plan:    Formatting of this note might be different from the original.   History & Physical per chart review.  Patient did not mention this in history      Discharge Summary       Follow-up      Liver cancer     Patient is not sure of the exact date he was diagnosed with this cancer.    Seizure disorder 2024    Last Assessment & Plan:    Formatting of this note might be different from the original.   History & Physical patient reports no seizure in the past 3 years.  Also does not follow with neurology.  Has been on Keppra prescribed by PCP.   -Will continue IV Keppra for now and transition to p.o. when able      Discharge Summary no seizure activity during the hospital stay.  Keppra resumed upon dischar     Family History   Problem Relation Name Age of Onset    Cancer Mother      Stroke Father      Hypertension Father      Hypertension Brother Patrick Toussaint      Social History     Socioeconomic History    Marital status:     Number of children: 0   Tobacco Use    Smoking status: Former     Current packs/day: 0.00     Average packs/day: 1 pack/day for 40.0 years (40.0 ttl pk-yrs)     Types: Cigarettes     Start date:      Quit date: 2020     Years since quittin.9    Smokeless tobacco: Former   Substance and Sexual Activity    Alcohol use: Not Currently    Drug use: Not Currently     Types: Marijuana     Social Drivers of Health     Food Insecurity: No Food Insecurity (2024)    Received from [facility]    Hunger Vital Sign     Worried About Running Out of Food in the Last Year: Never true     Ran Out of Food in the Last Year: Never true   Transportation Needs: No Transportation Needs (2024)    Received from [facility]    PRAPARE - Transportation     Lack of Transportation (Medical): No     Lack of Transportation (Non-Medical): No      Past Surgical History:   Procedure Laterality Date    APPENDECTOMY      Patient is not sure of the exact date but this was done when he was a kid.    TRACHEOTOMY  06/2024    Patient is not sure of the exact date but it was done in june 2024         Review of systems:  Review of Systems   Constitutional:  Positive for activity change. Negative for appetite change, chills, diaphoresis, fatigue and unexpected weight change.   HENT:  Positive for trouble swallowing and voice change. Negative for congestion, facial swelling, hearing loss and mouth sores.    Eyes:  Negative for photophobia, pain, discharge and itching.   Respiratory:  Negative for apnea, cough, choking, chest tightness and shortness of breath.    Cardiovascular:  Negative for chest pain, palpitations and leg swelling.   Gastrointestinal:  Negative for abdominal distention, abdominal pain, anal bleeding and blood in stool.   Endocrine: Negative for cold intolerance, heat intolerance, polydipsia and polyphagia.   Genitourinary:  Negative for difficulty urinating, dysuria, flank pain and hematuria.   Musculoskeletal:  Negative for arthralgias, back pain, joint swelling, myalgias, neck pain and neck stiffness.   Skin:  Negative for color change, pallor and wound.   Allergic/Immunologic: Negative for environmental allergies, food allergies and immunocompromised state.   Neurological:  Negative for dizziness, seizures, facial asymmetry, speech difficulty, light-headedness, numbness and headaches.   Hematological:  Negative for adenopathy. Does not bruise/bleed easily.   Psychiatric/Behavioral:  Negative for agitation, behavioral problems, confusion, decreased concentration and sleep disturbance.               Physical Exam  Vitals and nursing note reviewed.   Constitutional:       General: He is not in acute distress.     Appearance: Normal appearance. He is not ill-appearing.   HENT:      Head: Normocephalic and atraumatic.      Nose: No congestion or  rhinorrhea.   Eyes:      General: No scleral icterus.     Extraocular Movements: Extraocular movements intact.      Pupils: Pupils are equal, round, and reactive to light.   Neck:      Comments: +ve for trach in place  Cardiovascular:      Rate and Rhythm: Normal rate and regular rhythm.      Pulses: Normal pulses.      Heart sounds: Normal heart sounds. No murmur heard.     No gallop.   Pulmonary:      Effort: Pulmonary effort is normal. No respiratory distress.      Breath sounds: Normal breath sounds. No stridor. No wheezing or rhonchi.   Abdominal:      General: Bowel sounds are normal. There is no distension.      Palpations: There is no mass.      Tenderness: There is no abdominal tenderness. There is no guarding.   Musculoskeletal:         General: No swelling, tenderness, deformity or signs of injury. Normal range of motion.      Cervical back: Normal range of motion and neck supple. No rigidity. No muscular tenderness.      Right lower leg: No edema.      Left lower leg: No edema.   Skin:     General: Skin is warm.      Coloration: Skin is not jaundiced or pale.      Findings: No bruising or lesion.   Neurological:      General: No focal deficit present.      Mental Status: He is alert and oriented to person, place, and time.      Cranial Nerves: No cranial nerve deficit.      Sensory: No sensory deficit.      Motor: No weakness.      Gait: Gait normal.   Psychiatric:         Mood and Affect: Mood normal.         Behavior: Behavior normal.         Thought Content: Thought content normal.                   Vitals:    11/27/24 1025   BP: 114/67   Pulse: 78   Resp: 16   Temp: 97.9 °F (36.6 °C)         Body surface area is 1.64 meters squared.    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 P16 negative, PDL1 4%  == PET scan showed no evidence of distant metastatic disease.   == Recommendation per NCCN guidelines would be for high dose  concurrent chemo-XRT. I will send PA for port placement and chemotherapy with weekly Cisplatin 40 mg/m2 to be given with radiation.  ==08/20/24: audiogram ordered for cisplatin, pt had teeth removed and peg has been placed.  Patient here today to discuss upcoming chemotherapy/immunotherapy. An extensive discussion was had which included a thorough discussion of potential side effect profile, risk versus benefits, and expected outcomes.  Risks, including but not limited to, possible hair loss, bone marrow damage, damage to body organs, allergic reactions, sterility, nausea/vomiting, constipation/diarrhea, sores in the mouth, secondary cancers, and rarely death were all discuss.  Specific side effects pertaining to their chemotherapy/immunotherapy medications were discussed as well.  The patient agrees with the plan and all questions and their support systems questions have been answered to their satisfaction.  Premedications were prescribed and patient was educated on appropriate usage.  Patient was educated on when to call, and given the number to call, or to notify the provider including but not limited to:  Persistent nausea and vomiting, dehydration, fever greater than 100.4 lasting over 1 hour induration or isolated feeders greater than 101, rash while on active chemotherapy or immunotherapy, severe pain or new onset pain not controlled by current pain medication regimen.  ==09/09/24: Patient started XRT, did not get labs, drawn today approx 11am, will not be resulted in time to start cisplatin today.  Audiogram reviewed, grossly normal hearing.  Pt is on lactulose, requests refill, will send refill. Educated to hold if diarrhea.  Labs resulted after visit and reviewed, SrCr wnl, 0.84, Magnesium wnl, 1.9. Pt is cleared to start chemotherapy on 9/10/2024.  ==09/19/24: Pt had first chemotherapy on 9/12/24, tolerated well, no nausea.  Trach and peg tube in place functioning well. No complaints, labs reviewed, pt  cleared for chemotherapy.  ==10/03/24: Still having some nausea with chemotherapy relieved with ondansetron, no other complaints. Mg 1.6, pt will receive mg in pre and post hydration fluids with cisplatin.  Labs reviewed pt is cleared for chemotherapy.  Visible improvement noted to right neck mass  ==10/10/24: Having some nausea despite ondansetron, will start compazine. Pt complains of fatigue, activity intolerance, continued anemia, iron studies reviewed and are low, pt unable to swallow or tolerate po iron, will send iv iron for approval  ==10/17/24: Pt will complete XRT on 10/25/2024. Coordinated care with XRT and Dr. Barber will order post treatment scans. Pt to receive final cisplatin infusion and start IV iron. Labs reviewed pt cleared for chemotherapy.  == 11/27/24: CT scan for restaging showed significant interval improvement compared to PET/CT dated 8/28/24 with marked decreased size of massive bilateral neck jay disease and also of the very large hypopharyngeal/supraglottic laryngeal primary tumor. Post radiation changes are seen throughout the neck which could limit assessment for any residual disease. Will follow up on future PET scan in 3 months        2. H/O Hepatocellular Carcinoma:    == Reported by patient and on his notes from PCP  == Do not have any prior treatment records at this time. He reports being followed in [place]. Will obtain those treatment records from 2020    3. Hypertension  ==continue to follow up with pcp      4. Cancer cachexia  ==Continues to use % with 5-6 cans of nutritional supplement         Plan:  Restaging PET scan in 3 months  TMB discussion    Future Appointments   Date Time Provider Department Center   12/16/2024  1:00 PM Daniel Resendiz MD [id number]          Total time spent in counseling and discussion about further management options including relevant lab work, treatment,  prognosis, medications and intended side effects was more than 40  minutes. More than 50% of the time was spent on counseling and coordination of care.  I spent a total of 40 minutes on the day of the visit.This includes face to face time and non-face to face time preparing to see the patient (eg, review of tests), Obtaining and/or reviewing separately obtained history, Documenting clinical information in the electronic or other health record, Independently interpreting resultsand communicating results to the patient/family/caregiver, or Care coordination.

## 2024-12-09 ENCOUNTER — DOCUMENT SCAN (OUTPATIENT)
Dept: HOME HEALTH SERVICES | Facility: HOSPITAL | Age: 59
End: 2024-12-09
Payer: MEDICARE

## 2024-12-11 ENCOUNTER — OUTSIDE PLACE OF SERVICE (OUTPATIENT)
Dept: INTERVENTIONAL RADIOLOGY/VASCULAR | Facility: CLINIC | Age: 59
End: 2024-12-11
Payer: MEDICARE

## 2024-12-11 PROCEDURE — 74230 X-RAY XM SWLNG FUNCJ C+: CPT | Mod: 26,,, | Performed by: STUDENT IN AN ORGANIZED HEALTH CARE EDUCATION/TRAINING PROGRAM

## 2024-12-12 DIAGNOSIS — R91.1 LUNG NODULE: ICD-10-CM

## 2024-12-12 DIAGNOSIS — R91.1 LUNG NODULE: Primary | ICD-10-CM

## 2024-12-12 DIAGNOSIS — C13.9 HYPOPHARYNGEAL CANCER: Primary | ICD-10-CM

## 2024-12-12 DIAGNOSIS — C13.9 HYPOPHARYNGEAL CANCER: ICD-10-CM

## 2024-12-16 ENCOUNTER — OFFICE VISIT (OUTPATIENT)
Dept: PRIMARY CARE CLINIC | Facility: CLINIC | Age: 59
End: 2024-12-16
Payer: MEDICARE

## 2024-12-16 VITALS
HEIGHT: 73 IN | WEIGHT: 114 LBS | SYSTOLIC BLOOD PRESSURE: 94 MMHG | HEART RATE: 76 BPM | RESPIRATION RATE: 16 BRPM | TEMPERATURE: 99 F | OXYGEN SATURATION: 97 % | BODY MASS INDEX: 15.11 KG/M2 | DIASTOLIC BLOOD PRESSURE: 65 MMHG

## 2024-12-16 DIAGNOSIS — M46.96 UNSPECIFIED INFLAMMATORY SPONDYLOPATHY, LUMBAR REGION: ICD-10-CM

## 2024-12-16 DIAGNOSIS — K86.1 OTHER CHRONIC PANCREATITIS: ICD-10-CM

## 2024-12-16 DIAGNOSIS — K70.40 ALCOHOLIC HEPATIC FAILURE WITHOUT COMA: Primary | ICD-10-CM

## 2024-12-16 DIAGNOSIS — K80.61: ICD-10-CM

## 2024-12-16 DIAGNOSIS — C13.9 HYPOPHARYNGEAL CANCER: ICD-10-CM

## 2024-12-16 PROCEDURE — 4010F ACE/ARB THERAPY RXD/TAKEN: CPT | Mod: CPTII,,, | Performed by: INTERNAL MEDICINE

## 2024-12-16 PROCEDURE — 99214 OFFICE O/P EST MOD 30 MIN: CPT | Mod: S$PBB,,, | Performed by: INTERNAL MEDICINE

## 2024-12-16 PROCEDURE — 1160F RVW MEDS BY RX/DR IN RCRD: CPT | Mod: CPTII,,, | Performed by: INTERNAL MEDICINE

## 2024-12-16 PROCEDURE — 1159F MED LIST DOCD IN RCRD: CPT | Mod: CPTII,,, | Performed by: INTERNAL MEDICINE

## 2024-12-16 PROCEDURE — 3008F BODY MASS INDEX DOCD: CPT | Mod: CPTII,,, | Performed by: INTERNAL MEDICINE

## 2024-12-16 PROCEDURE — 3078F DIAST BP <80 MM HG: CPT | Mod: CPTII,,, | Performed by: INTERNAL MEDICINE

## 2024-12-16 PROCEDURE — 3044F HG A1C LEVEL LT 7.0%: CPT | Mod: CPTII,,, | Performed by: INTERNAL MEDICINE

## 2024-12-16 PROCEDURE — 3074F SYST BP LT 130 MM HG: CPT | Mod: CPTII,,, | Performed by: INTERNAL MEDICINE

## 2024-12-16 RX ORDER — ONDANSETRON HYDROCHLORIDE 8 MG/1
8 TABLET, FILM COATED ORAL EVERY 8 HOURS PRN
Qty: 30 TABLET | Refills: 2 | Status: SHIPPED | OUTPATIENT
Start: 2024-12-16 | End: 2025-12-16

## 2024-12-16 NOTE — PROGRESS NOTES
Subjective:      Patient ID: Wallace D Toussaint is a 59 y.o. male.    Chief Complaint: Follow-up    : Patient with history of seizures and is on Keppra. Patient reports seizures are under control with medication and last seizure was three years ago. Patient also has history of alcohol dependence, alcoholic liver cirrhosis and quit drinking 5 years ago.  Patient previous diagnosis suggest he had recurrent pancreatitis/chronic pancreatitis secondary to alcohol consumption.  Patient denies such episodes.  Patient had ultrasound abdomen that showed coarse liver echotexture without any discrete mass, prominent portal vein suggesting portal vein hypertension, multiple gallstones + dilated common bile duct with possible choledocholithiasis + pancreatic duct is dilated.  With these abnormalities patient needs a gastroenterologist but unfortunately we are not able to find a gastroenterologist in town.     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 Patient is now being followed by Dr. Gallagher/oncology.  Patient has finished chemo and radiation.  Patient reports he had modified barium swallow study but as he was aspirating.  Plan is to continue PEG tube feeding.    Patient has congestive heart failure with last echocardiogram showed EF of 50-65% with normal diastolic dysfunction.  Patient blood pressures are rather on lower side though he is off of all blood pressure medication including Entresto.  Patient denies any shortness a breath, PND, orthopnea, pedal edema.  Patient still uses a cane for mobility secondary to back pain. ( Patient is being followed by Dr. [facility])      Review of Systems   Constitutional:  Negative for chills, diaphoresis, fever, malaise/fatigue and weight loss.   HENT:  Negative for congestion, ear pain, sinus pain, sore throat and tinnitus.    Eyes:  Negative for blurred vision and photophobia.   Respiratory:   "Negative for cough, hemoptysis, shortness of breath and wheezing.    Cardiovascular:  Negative for chest pain, palpitations, orthopnea, leg swelling and PND.   Gastrointestinal:  Positive for nausea. Negative for abdominal pain, blood in stool, constipation, diarrhea, heartburn, melena and vomiting.   Genitourinary:  Negative for dysuria, frequency and urgency.   Musculoskeletal:  Negative for back pain, myalgias and neck pain.   Skin:  Negative for rash.   Neurological:  Negative for dizziness, tremors, seizures, loss of consciousness and weakness.   Endo/Heme/Allergies:  Negative for polydipsia.   Psychiatric/Behavioral:  Negative for depression and hallucinations. The patient does not have insomnia.      Objective:   BP 94/65 (BP Location: Right arm, Patient Position: Sitting)   Pulse 76   Temp 98.7 °F (37.1 °C) (Oral)   Resp 16   Ht 6' 1" (1.854 m)   Wt 51.7 kg (114 lb)   SpO2 97%   BMI 15.04 kg/m²     Physical Exam: Patient is vitally stable with no acute distress                              Patient PEG tube is in place without any surrounding erythema.    Assessment:       ICD-10-CM ICD-9-CM   1. Alcoholic hepatic failure without coma  K70.40 572.8   2. Hypopharyngeal cancer  C13.9 148.9   3. Other chronic pancreatitis  K86.1 577.1   4. Unspecified inflammatory spondylopathy, lumbar region  M46.96 720.9   5. Gallbladder & bile duct stone, nonacute cholecystitis & obstruction  K80.61 574.71       Plan:     Patient has questionable history of chronic pancreatitis.  Patient denies the diagnosis.  Patient has previous history of heavy alcohol consumption and diagnosis is very likely  Patient also has questionable history of liver cirrhosis  Ultrasound liver showed coarse liver echotexture + AST ALT + coagulation profile is normal  Albumin is low but that can be secondary to poor intake + protein malnutrition  Ultrasound also showed gallbladder stone + possible choledocholithiasis + dilated common bile " duct  Discussed with general surgeon Dr. Dykes and he agreed to take care of gallbladder stone + choledocholithiasis  General surgeon will probably be able to do ERCP.  Advised patient to find a gastroenterologist for further evaluation of liver injury.  Patient has hypopharyngeal cancer and is under care of Dr. Gallagher  Advised patient to keep appointment  Patient is off of all blood pressure medication and blood pressure seem under okay control.  Will continue to monitor  Medication List with Changes/Refills   Current Medications    FAMOTIDINE (PEPCID) 20 MG TABLET    Take 20 mg by mouth 2 (two) times daily.    LACTULOSE (CHRONULAC) 20 GRAM/30 ML SOLN    15 mLs (10 g total) by Per G Tube route 2 (two) times daily.    LEVETIRACETAM (KEPPRA) 100 MG/ML SOLN    1,000 mg by FEEDING TUBE route 2 (two) times daily.    METOPROLOL TARTRATE (LOPRESSOR) 25 MG TABLET    25 mg by FEEDING TUBE route once daily.   Changed and/or Refilled Medications    Modified Medication Previous Medication    ONDANSETRON (ZOFRAN) 8 MG TABLET ondansetron (ZOFRAN) 8 MG tablet       Take 1 tablet (8 mg total) by mouth every 8 (eight) hours as needed for Nausea.    Take 1 tablet (8 mg total) by mouth every 8 (eight) hours as needed for Nausea.   Discontinued Medications    ENTRESTO 24-26 MG PER TABLET    Take 1 tablet by mouth 2 (two) times daily.    PROCHLORPERAZINE (COMPAZINE) 10 MG TABLET    Take 1 tablet (10 mg total) by mouth every 8 (eight) hours as needed (nausea).
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
## 2025-01-29 ENCOUNTER — EXTERNAL HOME HEALTH (OUTPATIENT)
Dept: HOME HEALTH SERVICES | Facility: HOSPITAL | Age: 60
End: 2025-01-29
Payer: MEDICARE

## 2025-02-04 ENCOUNTER — TELEPHONE (OUTPATIENT)
Dept: PRIMARY CARE CLINIC | Facility: CLINIC | Age: 60
End: 2025-02-04
Payer: MEDICARE

## 2025-02-04 NOTE — TELEPHONE ENCOUNTER
----- Message from Saleem Yoo sent at 2/4/2025  4:24 PM CST -----    ----- Message -----  From: Sophy Izaguirre  Sent: 2/4/2025   2:06 PM CST  To: Martín Sanchez Staff    Patient sister is requesting a call back at [phone]

## 2025-02-05 ENCOUNTER — DOCUMENTATION ONLY (OUTPATIENT)
Dept: HEMATOLOGY/ONCOLOGY | Facility: CLINIC | Age: 60
End: 2025-02-05
Payer: MEDICARE

## 2025-02-05 NOTE — PROGRESS NOTES
Moved pt appt to sooner date per Dr. Barber and Dr. Gallgaher. Pt unable to come in until 2/17/25. Pt scheduled accordingly.

## 2025-02-06 DIAGNOSIS — R91.1 LUNG NODULE: Primary | ICD-10-CM

## 2025-02-07 ENCOUNTER — TELEPHONE (OUTPATIENT)
Dept: PULMONOLOGY | Facility: CLINIC | Age: 60
End: 2025-02-07
Payer: MEDICARE

## 2025-02-10 ENCOUNTER — OFFICE VISIT (OUTPATIENT)
Dept: PULMONOLOGY | Facility: CLINIC | Age: 60
End: 2025-02-10
Payer: MEDICARE

## 2025-02-10 VITALS
DIASTOLIC BLOOD PRESSURE: 86 MMHG | HEIGHT: 73 IN | OXYGEN SATURATION: 94 % | WEIGHT: 117 LBS | HEART RATE: 84 BPM | SYSTOLIC BLOOD PRESSURE: 132 MMHG | RESPIRATION RATE: 20 BRPM | BODY MASS INDEX: 15.51 KG/M2

## 2025-02-10 DIAGNOSIS — R91.8 LUNG NODULES: Primary | ICD-10-CM

## 2025-02-10 DIAGNOSIS — G40.909 SEIZURE DISORDER: ICD-10-CM

## 2025-02-10 DIAGNOSIS — K74.60 HEPATIC CIRRHOSIS, UNSPECIFIED HEPATIC CIRRHOSIS TYPE, UNSPECIFIED WHETHER ASCITES PRESENT: ICD-10-CM

## 2025-02-10 DIAGNOSIS — R91.8 LUNG MASS: ICD-10-CM

## 2025-02-10 DIAGNOSIS — Z85.89 HISTORY OF HEAD AND NECK CANCER: ICD-10-CM

## 2025-02-10 PROCEDURE — 3008F BODY MASS INDEX DOCD: CPT | Mod: CPTII,,, | Performed by: INTERNAL MEDICINE

## 2025-02-10 PROCEDURE — 1160F RVW MEDS BY RX/DR IN RCRD: CPT | Mod: CPTII,,, | Performed by: INTERNAL MEDICINE

## 2025-02-10 PROCEDURE — 3079F DIAST BP 80-89 MM HG: CPT | Mod: CPTII,,, | Performed by: INTERNAL MEDICINE

## 2025-02-10 PROCEDURE — 99204 OFFICE O/P NEW MOD 45 MIN: CPT | Mod: S$PBB,,, | Performed by: INTERNAL MEDICINE

## 2025-02-10 PROCEDURE — 3075F SYST BP GE 130 - 139MM HG: CPT | Mod: CPTII,,, | Performed by: INTERNAL MEDICINE

## 2025-02-10 PROCEDURE — 1159F MED LIST DOCD IN RCRD: CPT | Mod: CPTII,,, | Performed by: INTERNAL MEDICINE

## 2025-02-10 RX ORDER — TRAMADOL HYDROCHLORIDE 100 MG/1
100 TABLET, EXTENDED RELEASE ORAL DAILY
COMMUNITY

## 2025-02-10 NOTE — PROGRESS NOTES
Subjective:    Patient Identification:   Patient ID: Wallace D Toussaint is a 59 y.o. male.    Referring Provider:  Dr. Gallagher    Chief Complaint:  lung mass    History of Present Illness:    Wallace D Toussaint is a 59 y.o. 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 Gallagher for multiple lung nodules and a right upper lobe lung mass evaluation.  He also gives history of congestive heart failure but EF over 50% with normal diastolic dysfunction follows by Dr. Bhandari at [facility].  Patient's BMI is 15.4 kilos per m2.  He did not have a CT ion protocol that was ordered prior to this visit as he mentions he is unable to lay flat.    Review of Systems:  Review of Systems   Constitutional:  Negative for fever, chills, weight loss, weight gain, activity change, appetite change, fatigue, night sweats and weakness.   HENT:  Negative for nosebleeds, postnasal drip, rhinorrhea, sinus pressure, sore throat, trouble swallowing, voice change, congestion, ear pain and hearing loss.    Eyes:  Negative for redness and itching.   Respiratory:  Negative for cough, hemoptysis, sputum production, choking, chest tightness, shortness of breath, wheezing, orthopnea, previous hospitialization due to pulmonary problems, asthma nighttime symptoms, pleurisy, dyspnea on extertion, use of rescue inhaler and Paroxysmal Nocturnal Dyspnea.    Cardiovascular:  Negative for chest pain, palpitations and leg swelling.   Genitourinary:  Negative for difficulty urinating and hematuria.   Endocrine:  Negative for polydipsia, polyphagia, cold intolerance, heat intolerance and polyuria.    Musculoskeletal:  Negative for arthralgias, back pain, gait problem, joint swelling and myalgias.   Skin:  Negative for  rash.   Gastrointestinal:  Negative for nausea, vomiting, abdominal pain, abdominal distention and acid reflux.   Neurological:  Negative for dizziness, syncope, weakness, light-headedness and headaches.   Hematological:  Negative for adenopathy. Does not bruise/bleed easily and no excessive bruising.   Psychiatric/Behavioral:  Negative for confusion and sleep disturbance. The patient is not nervous/anxious.          Allergies: Review of patient's allergies indicates:  No Known Allergies    Medications:      Past Medical History:      Past Medical History:   Diagnosis Date    Alcoholic cirrhosis of liver without ascites 06/04/2024    Last Assessment & Plan:    Formatting of this note might be different from the original.   History & Physical patient reports history of cirrhosis currently appears to be on lactulose.  Complicated by possible hepatocellular carcinoma although patient thinks this was a misdiagnosis.  Currently compensated   -Will resume lactulose when able to swallow      Discharge Summary       Follow-up      Cancer of hypopharynx     Patient was diagnosed in June but not sure of the exact date.    Essential (primary) hypertension 06/04/2024    Last Assessment & Plan:    Formatting of this note might be different from the original.   History & Physical well-controlled on admission at 125/65.   Patient on Entresto and metoprolol which we will hold for now until cleared by speech      Discharge Summary blood pressure was well-controlled despite holding his home medications.  Will continue to hold upon discharge and can follow-up with PCP f    History of subdural hematoma 06/04/2024    Last Assessment & Plan:    Formatting of this note might be different from the original.   History & Physical per chart review.  Patient did not mention this in history      Discharge Summary       Follow-up      Liver cancer     Patient is not sure of the exact date he was diagnosed with this cancer.    Seizure disorder  06/04/2024    Last Assessment & Plan:    Formatting of this note might be different from the original.   History & Physical patient reports no seizure in the past 3 years.  Also does not follow with neurology.  Has been on Keppra prescribed by PCP.   -Will continue IV Keppra for now and transition to p.o. when able      Discharge Summary no seizure activity during the hospital stay.  Keppra resumed upon dischar       Family History:      Family History   Problem Relation Name Age of Onset    Cancer Mother      Stroke Father      Hypertension Father      Hypertension Brother Patrick Toussaint         Social History:      Past Surgical History:   Procedure Laterality Date    APPENDECTOMY      Patient is not sure of the exact date but this was done when he was a kid.    TRACHEOTOMY  06/2024    Patient is not sure of the exact date but it was done in june 2024       Physical Exam:  Vitals:    02/10/25 1030   BP: 132/86   Pulse: 84   Resp: 20     Physical Exam   Constitutional: He is oriented to person, place, and time. He appears cachectic. No distress.   HENT:   Head: Normocephalic.   Nose: Nose normal. No mucosal edema. No polyps.   Mouth/Throat: Oropharynx is clear and moist. Normal dentition. No oropharyngeal exudate.   Neck: No JVD present. No tracheal deviation present. No thyromegaly present.   Tracheostomy tube placement   Cardiovascular: Normal rate, regular rhythm, normal heart sounds and intact distal pulses. Exam reveals no gallop and no friction rub.   No murmur heard.  Pulmonary/Chest: Normal expansion, symmetric chest wall expansion, effort normal and breath sounds normal. No stridor. No respiratory distress. He has no decreased breath sounds. He has no wheezes. He has no rhonchi. He has no rales. Chest wall is not dull to percussion. He exhibits no tenderness. Negative for egophony. Negative for tactile fremitus.   Abdominal: Soft. Bowel sounds are normal. He exhibits no distension and no mass. There is  no hepatosplenomegaly. There is no abdominal tenderness. There is no rebound and no guarding. No hernia.   PEG tube   Musculoskeletal:         General: No tenderness, deformity or edema. Normal range of motion.   Lymphadenopathy: No supraclavicular adenopathy is present.     He has no cervical adenopathy.     He has no axillary adenopathy.   Neurological: He is alert and oriented to person, place, and time. He displays normal reflexes. No cranial nerve deficit. He exhibits normal muscle tone.   Skin: Skin is warm and dry. No rash noted. He is not diaphoretic. No cyanosis or erythema. No pallor. Nails show no clubbing.   Psychiatric: He has a normal mood and affect. His behavior is normal. Judgment and thought content normal.           Accessory Clinical Data:  Chest x-ray:    CT scan:  I have reviewed the PET images    PFTs:     6MWT:     TTE:    Lab data:    All radiographic imaging of the chest, PFT tracings/data, and 6MWT data have been independently reviewed and interpreted unless otherwise specified.     Assessment and Plan:        Problem List Items Addressed This Visit          Neuro    Seizure disorder    Overview     Last Assessment & Plan:    Formatting of this note might be different from the original.   History & Physical patient reports no seizure in the past 3 years.  Also does not follow with neurology.  Has been on Keppra prescribed by PCP.   -Will continue IV Keppra for now and transition to p.o. when able      Discharge Summary no seizure activity during the hospital stay.  Keppra resumed upon discharge.      Follow-up    Continue Keppra.          Other Visit Diagnoses       Lung nodules    -  Primary    Lung mass        History of head and neck cancer        Hepatic cirrhosis, unspecified hepatic cirrhosis type, unspecified whether ascites present               I have looked at the images and I agree this is likely metastatic disease.  Patient needs tissue diagnosis.  Unfortunately, he has not  performed the CT ion protocol for planning purposes.  I have explained him that he has to late on only for few seconds but he has categorically denied to lay flat for CT as he tells me that it constrictive his neck and he is unable to breathe.  Unfortunately, without planning CT I can not perform robotic bronchoscopy.  I discussed the case with Dr. Gallagher and I am going to refer the patient for CT-guided biopsy of right upper lobe mass.  He has h/o no shows and non-compliance, therefore will request for immediate biopsy.      Follow up for Follow up with Dr. Gallagher once biopsy results are available.  Thank you very much for involving me in the care of this patient.  Please do not hesitate to reach me if you have any further questions or concerns.    This note is dictated on M*Modal word recognition program.  There are word recognition mistakes that are occasionally missed on review.

## 2025-02-17 LAB
ALBUMIN SERPL BCP-MCNC: 2.8 G/DL (ref 3.4–5)
ALBUMIN/GLOBULIN RATIO: 0.62 RATIO (ref 1.1–1.8)
ALP SERPL-CCNC: 150 U/L (ref 46–116)
ALT SERPL W P-5'-P-CCNC: 39 U/L (ref 12–78)
ANION GAP SERPL CALC-SCNC: 7 MMOL/L (ref 3–11)
AST SERPL-CCNC: 44 U/L (ref 15–37)
BASOPHILS NFR BLD: 0.3 % (ref 0–3)
BILIRUB SERPL-MCNC: 0.9 MG/DL (ref 0–1)
BUN SERPL-MCNC: 28 MG/DL (ref 7–18)
BUN/CREAT SERPL: 38.35 RATIO (ref 7–18)
CALCIUM SERPL-MCNC: 9.1 MG/DL (ref 8.8–10.5)
CHLORIDE SERPL-SCNC: 102 MMOL/L (ref 100–108)
CO2 SERPL-SCNC: 30 MMOL/L (ref 21–32)
CREAT SERPL-MCNC: 0.73 MG/DL (ref 0.7–1.3)
EOSINOPHIL NFR BLD: 3 % (ref 1–3)
ERYTHROCYTE [DISTWIDTH] IN BLOOD BY AUTOMATED COUNT: 15.4 % (ref 12.5–18)
GFR ESTIMATION: 104
GLOBULIN: 4.5 G/DL (ref 2.3–3.5)
GLUCOSE SERPL-MCNC: 172 MG/DL (ref 70–110)
HCT VFR BLD AUTO: 37.1 % (ref 42–52)
HGB BLD-MCNC: 12 G/DL (ref 14–18)
LYMPHOCYTES NFR BLD: 12.8 % (ref 25–40)
MCH RBC QN AUTO: 29.8 PG (ref 27–31.2)
MCHC RBC AUTO-ENTMCNC: 32.3 G/DL (ref 31.8–35.4)
MCV RBC AUTO: 92.1 FL (ref 80–97)
MONOCYTES NFR BLD: 12.5 % (ref 1–15)
NEUTROPHILS # BLD AUTO: 2.38 10*3/UL (ref 1.8–7.7)
NEUTROPHILS NFR BLD: 71.1 % (ref 37–80)
NUCLEATED RED BLOOD CELLS: 0 %
PLATELETS: 102 10*3/UL (ref 142–424)
POTASSIUM SERPL-SCNC: 4.3 MMOL/L (ref 3.6–5.2)
PROT SERPL-MCNC: 7.3 G/DL (ref 6.4–8.2)
RBC # BLD AUTO: 4.03 10*6/UL (ref 4.7–6.1)
SODIUM BLD-SCNC: 139 MMOL/L (ref 135–145)
WBC # BLD: 3.4 10*3/UL (ref 4.6–10.2)

## 2025-02-18 ENCOUNTER — OFFICE VISIT (OUTPATIENT)
Dept: HEMATOLOGY/ONCOLOGY | Facility: CLINIC | Age: 60
End: 2025-02-18
Payer: MEDICARE

## 2025-02-18 VITALS
SYSTOLIC BLOOD PRESSURE: 118 MMHG | HEART RATE: 64 BPM | OXYGEN SATURATION: 93 % | TEMPERATURE: 98 F | BODY MASS INDEX: 15.8 KG/M2 | DIASTOLIC BLOOD PRESSURE: 74 MMHG | WEIGHT: 119.19 LBS | HEIGHT: 73 IN | RESPIRATION RATE: 16 BRPM

## 2025-02-18 DIAGNOSIS — C78.02 MALIGNANT NEOPLASM METASTATIC TO BOTH LUNGS: Primary | ICD-10-CM

## 2025-02-18 DIAGNOSIS — C78.01 MALIGNANT NEOPLASM METASTATIC TO BOTH LUNGS: Primary | ICD-10-CM

## 2025-02-18 DIAGNOSIS — Z93.1 GASTROSTOMY STATUS: ICD-10-CM

## 2025-02-18 PROBLEM — C78.00 METASTASIS TO LUNG: Status: ACTIVE | Noted: 2025-02-18

## 2025-02-18 PROCEDURE — 1159F MED LIST DOCD IN RCRD: CPT | Mod: CPTII,,, | Performed by: INTERNAL MEDICINE

## 2025-02-18 PROCEDURE — 3074F SYST BP LT 130 MM HG: CPT | Mod: CPTII,,, | Performed by: INTERNAL MEDICINE

## 2025-02-18 PROCEDURE — 1160F RVW MEDS BY RX/DR IN RCRD: CPT | Mod: CPTII,,, | Performed by: INTERNAL MEDICINE

## 2025-02-18 PROCEDURE — 99215 OFFICE O/P EST HI 40 MIN: CPT | Mod: S$PBB,,, | Performed by: INTERNAL MEDICINE

## 2025-02-18 PROCEDURE — 3008F BODY MASS INDEX DOCD: CPT | Mod: CPTII,,, | Performed by: INTERNAL MEDICINE

## 2025-02-18 PROCEDURE — 3078F DIAST BP <80 MM HG: CPT | Mod: CPTII,,, | Performed by: INTERNAL MEDICINE

## 2025-02-18 RX ORDER — TRAMADOL HYDROCHLORIDE 50 MG/1
50 TABLET ORAL EVERY 6 HOURS
COMMUNITY

## 2025-02-18 NOTE — PROGRESS NOTES
MEDICAL ONCOLOGY FOLLOW UP CONSULTATION NOTE    Patient ID: Wallace D Toussaint is a 59 y.o. male.    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  He has trach in place and has been evaluated recently by radiation oncology for concurrent chemoradiation.  He presents to our clinic today for further evaluation.  He has yet to see the dentist for dental clearance prior to starting radiation treatment.  He is accompanied by his brother Pierce as patient no showed previously on 3 different occasions.         Imaging:     PET scan:   No evidence of distant metastatic disease    Past Medical History:   Diagnosis Date    Alcoholic cirrhosis of liver without ascites 06/04/2024    Last Assessment & Plan:    Formatting of this note might be different from the original.   History & Physical patient reports history of cirrhosis currently appears to be on lactulose.  Complicated by possible hepatocellular carcinoma although patient thinks this was a misdiagnosis.  Currently compensated   -Will resume lactulose when able to swallow      Discharge Summary       Follow-up      Cancer of hypopharynx     Patient was diagnosed in June but not sure of the exact date.    Essential (primary) hypertension 06/04/2024    Last Assessment & Plan:    Formatting of this note might be different from the original.   History & Physical well-controlled on admission at 125/65.   Patient on Entresto and metoprolol which we will hold for now until cleared by speech      Discharge Summary blood pressure was well-controlled despite holding his home medications.  Will continue to hold upon discharge and can follow-up with PCP f    History of subdural hematoma 06/04/2024    Last Assessment &  Plan:    Formatting of this note might be different from the original.   History & Physical per chart review.  Patient did not mention this in history      Discharge Summary       Follow-up      Liver cancer     Patient is not sure of the exact date he was diagnosed with this cancer.    Seizure disorder 2024    Last Assessment & Plan:    Formatting of this note might be different from the original.   History & Physical patient reports no seizure in the past 3 years.  Also does not follow with neurology.  Has been on Keppra prescribed by PCP.   -Will continue IV Keppra for now and transition to p.o. when able      Discharge Summary no seizure activity during the hospital stay.  Keppra resumed upon dischar     Family History   Problem Relation Name Age of Onset    Cancer Mother      Stroke Father      Hypertension Father      Hypertension Brother Patrick Toussaint      Social History     Socioeconomic History    Marital status:     Number of children: 0   Tobacco Use    Smoking status: Former     Current packs/day: 0.00     Average packs/day: 1 pack/day for 40.0 years (40.0 ttl pk-yrs)     Types: Cigarettes     Start date:      Quit date: 2020     Years since quittin.1    Smokeless tobacco: Former   Substance and Sexual Activity    Alcohol use: Not Currently    Drug use: Not Currently     Types: Marijuana     Social Drivers of Health     Food Insecurity: No Food Insecurity (2024)    Received from [facility] Subsidiaries and Affiliates    Hunger Vital Sign     Worried About Running Out of Food in the Last Year: Never true     Ran Out of Food in the Last Year: Never true   Transportation Needs: No Transportation Needs (2024)    Received from [facility] and [facility]    PRAPARE - Transportation     Lack of Transportation (Medical): No     Lack of Transportation (Non-Medical): No      Past Surgical History:   Procedure Laterality Date    APPENDECTOMY      Patient is not sure of the exact date but this was done when he was a kid.    TRACHEOTOMY  06/2024    Patient is not sure of the exact date but it was done in june 2024         Review of systems:  Review of Systems   Constitutional:  Positive for activity change. Negative for appetite change, chills, diaphoresis, fatigue and unexpected weight change.   HENT:  Positive for trouble swallowing and voice change. Negative for congestion, facial swelling, hearing loss and mouth sores.    Eyes:  Negative for photophobia, pain, discharge and itching.   Respiratory:  Negative for apnea, cough, choking, chest tightness and shortness of breath.    Cardiovascular:  Negative for chest pain, palpitations and leg swelling.   Gastrointestinal:  Negative for abdominal distention, abdominal pain, anal bleeding and blood in stool.   Endocrine: Negative for cold intolerance, heat intolerance, polydipsia and polyphagia.   Genitourinary:  Negative for difficulty urinating, dysuria, flank pain and hematuria.   Musculoskeletal:  Negative for arthralgias, back pain, joint swelling, myalgias, neck pain and neck stiffness.   Skin:  Negative for color change, pallor and wound.   Allergic/Immunologic: Negative for environmental allergies, food allergies and immunocompromised state.   Neurological:  Negative for dizziness, seizures, facial asymmetry, speech difficulty, light-headedness, numbness and headaches.   Hematological:  Negative for adenopathy. Does not bruise/bleed easily.   Psychiatric/Behavioral:  Negative for agitation, behavioral problems, confusion, decreased concentration and sleep disturbance.               Physical Exam  Vitals and nursing note reviewed.   Constitutional:       General: He is not in acute distress.     Appearance: Normal appearance. He is not ill-appearing.   HENT:      Head: Normocephalic and atraumatic.      Nose: No congestion or  rhinorrhea.   Eyes:      General: No scleral icterus.     Extraocular Movements: Extraocular movements intact.      Pupils: Pupils are equal, round, and reactive to light.   Neck:      Comments: +ve for trach in place  Cardiovascular:      Rate and Rhythm: Normal rate and regular rhythm.      Pulses: Normal pulses.      Heart sounds: Normal heart sounds. No murmur heard.     No gallop.   Pulmonary:      Effort: Pulmonary effort is normal. No respiratory distress.      Breath sounds: Normal breath sounds. No stridor. No wheezing or rhonchi.   Abdominal:      General: Bowel sounds are normal. There is no distension.      Palpations: There is no mass.      Tenderness: There is no abdominal tenderness. There is no guarding.   Musculoskeletal:         General: No swelling, tenderness, deformity or signs of injury. Normal range of motion.      Cervical back: Normal range of motion and neck supple. No rigidity. No muscular tenderness.      Right lower leg: No edema.      Left lower leg: No edema.   Skin:     General: Skin is warm.      Coloration: Skin is not jaundiced or pale.      Findings: No bruising or lesion.   Neurological:      General: No focal deficit present.      Mental Status: He is alert and oriented to person, place, and time.      Cranial Nerves: No cranial nerve deficit.      Sensory: No sensory deficit.      Motor: No weakness.      Gait: Gait normal.   Psychiatric:         Mood and Affect: Mood normal.         Behavior: Behavior normal.         Thought Content: Thought content normal.                   Vitals:    02/18/25 0914   BP: 118/74   Pulse: 64   Resp: 16   Temp: 97.8 °F (36.6 °C)         Body surface area is 1.67 meters squared.    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 P16 negative, PDL1 4%  == PET scan showed no evidence of distant  metastatic disease.   == Recommendation per NCCN guidelines would be for high dose concurrent chemo-XRT. I will send PA for port placement and chemotherapy with weekly Cisplatin 40 mg/m2 to be given with radiation.  ==08/20/24: audiogram ordered for cisplatin, pt had teeth removed and peg has been placed.  Patient here today to discuss upcoming chemotherapy/immunotherapy. An extensive discussion was had which included a thorough discussion of potential side effect profile, risk versus benefits, and expected outcomes.  Risks, including but not limited to, possible hair loss, bone marrow damage, damage to body organs, allergic reactions, sterility, nausea/vomiting, constipation/diarrhea, sores in the mouth, secondary cancers, and rarely death were all discuss.  Specific side effects pertaining to their chemotherapy/immunotherapy medications were discussed as well.  The patient agrees with the plan and all questions and their support systems questions have been answered to their satisfaction.  Premedications were prescribed and patient was educated on appropriate usage.  Patient was educated on when to call, and given the number to call, or to notify the provider including but not limited to:  Persistent nausea and vomiting, dehydration, fever greater than 100.4 lasting over 1 hour induration or isolated feeders greater than 101, rash while on active chemotherapy or immunotherapy, severe pain or new onset pain not controlled by current pain medication regimen.  ==09/09/24: Patient started XRT, did not get labs, drawn today approx 11am, will not be resulted in time to start cisplatin today.  Audiogram reviewed, grossly normal hearing.  Pt is on lactulose, requests refill, will send refill. Educated to hold if diarrhea.  Labs resulted after visit and reviewed, SrCr wnl, 0.84, Magnesium wnl, 1.9. Pt is cleared to start chemotherapy on 9/10/2024.  ==09/19/24: Pt had first chemotherapy on 9/12/24, tolerated well, no nausea.   Trach and peg tube in place functioning well. No complaints, labs reviewed, pt cleared for chemotherapy.  ==10/03/24: Still having some nausea with chemotherapy relieved with ondansetron, no other complaints. Mg 1.6, pt will receive mg in pre and post hydration fluids with cisplatin.  Labs reviewed pt is cleared for chemotherapy.  Visible improvement noted to right neck mass  ==10/10/24: Having some nausea despite ondansetron, will start compazine. Pt complains of fatigue, activity intolerance, continued anemia, iron studies reviewed and are low, pt unable to swallow or tolerate po iron, will send iv iron for approval  ==10/17/24: Pt will complete XRT on 10/25/2024. Coordinated care with XRT and Dr. Barber will order post treatment scans. Pt to receive final cisplatin infusion and start IV iron. Labs reviewed pt cleared for chemotherapy.  == 11/27/24: CT scan for restaging showed significant interval improvement compared to PET/CT dated 8/28/24 with marked decreased size of massive bilateral neck jay disease and also of the very large hypopharyngeal/supraglottic laryngeal primary tumor. Post radiation changes are seen throughout the neck which could limit assessment for any residual disease. Will follow up on future PET scan in 3 months  == 2/18/25: PET scan for restaging showed progressive disease with new onset pulmonary nodules concerning for metastatic disease. Pulmonary/IR biopsy for confirmation. Considering PDL1 +ve, will recommend to start Keytruda, which is category 1 per NCCN        2. H/O Hepatocellular Carcinoma:    == Reported by patient and on his notes from PCP  == Do not have any prior treatment records at this time. He reports being followed in [place]. Will obtain those treatment records from 2020    3. Hypertension  ==continue to follow up with pcp      4. Cancer cachexia  ==Continues to use % with 5-6 cans of nutritional supplement         Plan:  PA for Palliative immunotherapy with  Keytruda      RTC in 1 week for treatment education with NP         Total time spent in counseling and discussion about further management options including relevant lab work, treatment,  prognosis, medications and intended side effects was more than 60 minutes. More than 50% of the time was spent on counseling and coordination of care.  I spent a total of 60 minutes on the day of the visit.This includes face to face time and non-face to face time preparing to see the patient (eg, review of tests), Obtaining and/or reviewing separately obtained history, Documenting clinical information in the electronic or other health record, Independently interpreting resultsand communicating results to the patient/family/caregiver, or Care coordination.

## 2025-02-24 ENCOUNTER — TELEPHONE (OUTPATIENT)
Dept: PULMONOLOGY | Facility: CLINIC | Age: 60
End: 2025-02-24
Payer: MEDICARE

## 2025-02-24 NOTE — TELEPHONE ENCOUNTER
Spoke with Clement with IR at [facility]'s patient is refusing IR BX due to him not being able to lay flat on back due to his trach. So they will not be able to proceed with his BX either for this reason. MEETA

## 2025-02-25 ENCOUNTER — CLINICAL SUPPORT (OUTPATIENT)
Dept: HEMATOLOGY/ONCOLOGY | Facility: CLINIC | Age: 60
End: 2025-02-25
Payer: MEDICARE

## 2025-02-25 VITALS
BODY MASS INDEX: 15.84 KG/M2 | RESPIRATION RATE: 16 BRPM | HEART RATE: 82 BPM | TEMPERATURE: 97 F | OXYGEN SATURATION: 96 % | SYSTOLIC BLOOD PRESSURE: 108 MMHG | HEIGHT: 73 IN | WEIGHT: 119.5 LBS | DIASTOLIC BLOOD PRESSURE: 72 MMHG

## 2025-02-25 DIAGNOSIS — C13.9 HYPOPHARYNGEAL CANCER: ICD-10-CM

## 2025-02-25 DIAGNOSIS — C78.7 SECONDARY MALIGNANT NEOPLASM OF LIVER AND INTRAHEPATIC BILE DUCT: ICD-10-CM

## 2025-02-25 DIAGNOSIS — D69.6 THROMBOCYTOPENIA, UNSPECIFIED: ICD-10-CM

## 2025-02-25 DIAGNOSIS — R91.1 LUNG NODULE: ICD-10-CM

## 2025-02-25 DIAGNOSIS — C13.8 MALIGNANT NEOPLASM OF OVERLAPPING SITES OF HYPOPHARYNX: ICD-10-CM

## 2025-02-25 DIAGNOSIS — Z93.1 GASTROSTOMY STATUS: ICD-10-CM

## 2025-02-25 DIAGNOSIS — C78.02 MALIGNANT NEOPLASM METASTATIC TO BOTH LUNGS: Primary | ICD-10-CM

## 2025-02-25 DIAGNOSIS — C78.01 MALIGNANT NEOPLASM METASTATIC TO BOTH LUNGS: Primary | ICD-10-CM

## 2025-02-25 PROCEDURE — G2211 COMPLEX E/M VISIT ADD ON: HCPCS | Mod: S$PBB,,, | Performed by: NURSE PRACTITIONER

## 2025-02-25 PROCEDURE — 99215 OFFICE O/P EST HI 40 MIN: CPT | Mod: S$PBB,,, | Performed by: NURSE PRACTITIONER

## 2025-02-25 RX ORDER — MUPIROCIN 20 MG/G
2 OINTMENT TOPICAL DAILY
COMMUNITY
Start: 2025-02-13

## 2025-02-25 NOTE — PROGRESS NOTES
MEDICAL ONCOLOGY FOLLOW UP CONSULTATION NOTE    Patient ID: Wallace D Toussaint is a 59 y.o. male.    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  He has trach in place and has been evaluated recently by radiation oncology for concurrent chemoradiation.  He presents to our clinic today for further evaluation.  He has yet to see the dentist for dental clearance prior to starting radiation treatment.  He is accompanied by his brother Pierce as patient no showed previously on 3 different occasions.         Imaging:     PET scan:   No evidence of distant metastatic disease    Past Medical History:   Diagnosis Date    Alcoholic cirrhosis of liver without ascites 06/04/2024    Last Assessment & Plan:    Formatting of this note might be different from the original.   History & Physical patient reports history of cirrhosis currently appears to be on lactulose.  Complicated by possible hepatocellular carcinoma although patient thinks this was a misdiagnosis.  Currently compensated   -Will resume lactulose when able to swallow      Discharge Summary       Follow-up      Cancer of hypopharynx     Patient was diagnosed in June but not sure of the exact date.    Essential (primary) hypertension 06/04/2024    Last Assessment & Plan:    Formatting of this note might be different from the original.   History & Physical well-controlled on admission at 125/65.   Patient on Entresto and metoprolol which we will hold for now until cleared by speech      Discharge Summary blood pressure was well-controlled despite holding his home medications.  Will continue to hold upon discharge and can follow-up with PCP f    History of subdural hematoma 06/04/2024    Last Assessment &  Plan:    Formatting of this note might be different from the original.   History & Physical per chart review.  Patient did not mention this in history      Discharge Summary       Follow-up      Liver cancer     Patient is not sure of the exact date he was diagnosed with this cancer.    Seizure disorder 2024    Last Assessment & Plan:    Formatting of this note might be different from the original.   History & Physical patient reports no seizure in the past 3 years.  Also does not follow with neurology.  Has been on Keppra prescribed by PCP.   -Will continue IV Keppra for now and transition to p.o. when able      Discharge Summary no seizure activity during the hospital stay.  Keppra resumed upon dischar     Family History   Problem Relation Name Age of Onset    Cancer Mother      Stroke Father      Hypertension Father      Hypertension Brother Patrick Toussaint      Social History     Socioeconomic History    Marital status:     Number of children: 0   Tobacco Use    Smoking status: Former     Current packs/day: 0.00     Average packs/day: 1 pack/day for 40.0 years (40.0 ttl pk-yrs)     Types: Cigarettes     Start date:      Quit date: 2020     Years since quittin.1    Smokeless tobacco: Former   Substance and Sexual Activity    Alcohol use: Not Currently    Drug use: Not Currently     Types: Marijuana     Social Drivers of Health     Food Insecurity: No Food Insecurity (2024)    Received from [employer] [facility] and Its Subsidiaries and Affiliates    Hunger Vital Sign     Worried About Running Out of Food in the Last Year: Never true     Ran Out of Food in the Last Year: Never true   Transportation Needs: No Transportation Needs (2024)    Received from [employer] [facility]sidi[facility] and [facility]    PRAPARE - Transportation     Lack of Transportation (Medical): No     Lack of Transportation (Non-Medical): No      Past Surgical History:   Procedure Laterality Date    APPENDECTOMY      Patient is not sure of the exact date but this was done when he was a kid.    TRACHEOTOMY  06/2024    Patient is not sure of the exact date but it was done in june 2024         Review of systems:  Review of Systems   Constitutional:  Positive for activity change. Negative for appetite change, chills, diaphoresis, fatigue and unexpected weight change.   HENT:  Positive for trouble swallowing and voice change. Negative for congestion, facial swelling, hearing loss and mouth sores.    Eyes:  Negative for photophobia, pain, discharge and itching.   Respiratory:  Negative for apnea, cough, choking, chest tightness and shortness of breath.    Cardiovascular:  Negative for chest pain, palpitations and leg swelling.   Gastrointestinal:  Negative for abdominal distention, abdominal pain, anal bleeding and blood in stool.   Endocrine: Negative for cold intolerance, heat intolerance, polydipsia and polyphagia.   Genitourinary:  Negative for difficulty urinating, dysuria, flank pain and hematuria.   Musculoskeletal:  Negative for arthralgias, back pain, joint swelling, myalgias, neck pain and neck stiffness.   Skin:  Negative for color change, pallor and wound.   Allergic/Immunologic: Negative for environmental allergies, food allergies and immunocompromised state.   Neurological:  Negative for dizziness, seizures, facial asymmetry, speech difficulty, light-headedness, numbness and headaches.   Hematological:  Negative for adenopathy. Does not bruise/bleed easily.   Psychiatric/Behavioral:  Negative for agitation, behavioral problems, confusion, decreased concentration and sleep disturbance.               Physical Exam  Vitals and nursing note reviewed.   Constitutional:       General: He is not in acute distress.     Appearance: Normal appearance. He is not ill-appearing.   HENT:      Head: Normocephalic and atraumatic.      Nose: No congestion or  rhinorrhea.   Eyes:      General: No scleral icterus.     Extraocular Movements: Extraocular movements intact.      Pupils: Pupils are equal, round, and reactive to light.   Neck:      Comments: +ve for trach in place  Cardiovascular:      Rate and Rhythm: Normal rate and regular rhythm.      Pulses: Normal pulses.      Heart sounds: Normal heart sounds. No murmur heard.     No gallop.   Pulmonary:      Effort: Pulmonary effort is normal. No respiratory distress.      Breath sounds: Normal breath sounds. No stridor. No wheezing or rhonchi.   Abdominal:      General: Bowel sounds are normal. There is no distension.      Palpations: There is no mass.      Tenderness: There is no abdominal tenderness. There is no guarding.   Musculoskeletal:         General: No swelling, tenderness, deformity or signs of injury. Normal range of motion.      Cervical back: Normal range of motion and neck supple. No rigidity. No muscular tenderness.      Right lower leg: No edema.      Left lower leg: No edema.   Skin:     General: Skin is warm.      Coloration: Skin is not jaundiced or pale.      Findings: No bruising or lesion.   Neurological:      General: No focal deficit present.      Mental Status: He is alert and oriented to person, place, and time.      Cranial Nerves: No cranial nerve deficit.      Sensory: No sensory deficit.      Motor: No weakness.      Gait: Gait normal.   Psychiatric:         Mood and Affect: Mood normal.         Behavior: Behavior normal.         Thought Content: Thought content normal.                 Vitals:    02/25/25 1135   BP: 108/72   Pulse: 82   Resp: 16   Temp: 97.3 °F (36.3 °C)         Body surface area is 1.67 meters squared.    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 P16 negative, PDL1 4%  == PET scan showed no evidence of distant  metastatic disease.   == Recommendation per NCCN guidelines would be for high dose concurrent chemo-XRT. I will send PA for port placement and chemotherapy with weekly Cisplatin 40 mg/m2 to be given with radiation.  ==08/20/24: audiogram ordered for cisplatin, pt had teeth removed and peg has been placed.  Patient here today to discuss upcoming chemotherapy/immunotherapy. An extensive discussion was had which included a thorough discussion of potential side effect profile, risk versus benefits, and expected outcomes.  Risks, including but not limited to, possible hair loss, bone marrow damage, damage to body organs, allergic reactions, sterility, nausea/vomiting, constipation/diarrhea, sores in the mouth, secondary cancers, and rarely death were all discuss.  Specific side effects pertaining to their chemotherapy/immunotherapy medications were discussed as well.  The patient agrees with the plan and all questions and their support systems questions have been answered to their satisfaction.  Premedications were prescribed and patient was educated on appropriate usage.  Patient was educated on when to call, and given the number to call, or to notify the provider including but not limited to:  Persistent nausea and vomiting, dehydration, fever greater than 100.4 lasting over 1 hour induration or isolated feeders greater than 101, rash while on active chemotherapy or immunotherapy, severe pain or new onset pain not controlled by current pain medication regimen.  ==09/09/24: Patient started XRT, did not get labs, drawn today approx 11am, will not be resulted in time to start cisplatin today.  Audiogram reviewed, grossly normal hearing.  Pt is on lactulose, requests refill, will send refill. Educated to hold if diarrhea.  Labs resulted after visit and reviewed, SrCr wnl, 0.84, Magnesium wnl, 1.9. Pt is cleared to start chemotherapy on 9/10/2024.  ==09/19/24: Pt had first chemotherapy on 9/12/24, tolerated well, no nausea.   Trach and peg tube in place functioning well. No complaints, labs reviewed, pt cleared for chemotherapy.  ==10/03/24: Still having some nausea with chemotherapy relieved with ondansetron, no other complaints. Mg 1.6, pt will receive mg in pre and post hydration fluids with cisplatin.  Labs reviewed pt is cleared for chemotherapy.  Visible improvement noted to right neck mass  ==10/10/24: Having some nausea despite ondansetron, will start compazine. Pt complains of fatigue, activity intolerance, continued anemia, iron studies reviewed and are low, pt unable to swallow or tolerate po iron, will send iv iron for approval  ==10/17/24: Pt will complete XRT on 10/25/2024. Coordinated care with XRT and Dr. Barber will order post treatment scans. Pt to receive final cisplatin infusion and start IV iron. Labs reviewed pt cleared for chemotherapy.  == 11/27/24: CT scan for restaging showed significant interval improvement compared to PET/CT dated 8/28/24 with marked decreased size of massive bilateral neck jay disease and also of the very large hypopharyngeal/supraglottic laryngeal primary tumor. Post radiation changes are seen throughout the neck which could limit assessment for any residual disease. Will follow up on future PET scan in 3 months  == 2/18/25: PET scan for restaging showed progressive disease with new onset pulmonary nodules concerning for metastatic disease. Pulmonary/IR biopsy for confirmation. Considering PDL1 +ve, will recommend to start Keytruda, which is category 1 per NCCN  ==02/25/2025:  Patient here today to discuss upcoming chemotherapy/immunotherapy. An extensive discussion was had which included a thorough discussion of potential side effect profile, risk versus benefits, and expected outcomes.  Risks, including but not limited to, possible hair loss, bone marrow damage, damage to body organs, allergic reactions, sterility, nausea/vomiting, constipation/diarrhea, sores in the mouth, secondary  cancers, and rarely death were all discuss.  Specific side effects pertaining to their chemotherapy/immunotherapy medications were discussed as well.  The patient agrees with the plan and all questions and their support systems questions have been answered to their satisfaction.  Premedications were prescribed and patient was educated on appropriate usage.  Patient was educated on when to call, and given the number to call, or to notify the provider including but not limited to:  Persistent nausea and vomiting, dehydration, fever greater than 100.4 lasting over 1 hour induration or isolated feeders greater than 101, rash while on active chemotherapy or immunotherapy, severe pain or new onset pain not controlled by current pain medication regimen.  Per pulm nurse's note patient declined lung nodule biopsy as he can not lay flat feels like it puts pressure on his neck/trach, discussed immunotherapy education with keytruda will start on 3/6/25        2. H/O Hepatocellular Carcinoma:    == Reported by patient and on his notes from PCP  == Do not have any prior treatment records at this time. He reports being followed in [place]. Will obtain those treatment records from 2020    3. Hypertension  ==continue to follow up with pcp      4. Cancer cachexia  ==Continues to use % with 5-6 cans of nutritional supplement         Plan:  Plan to start palliative immunotherapy with Keytruda 3/6/25  Rtc 3/6/2025 cbc cmp tsh prior         Total time spent in counseling and discussion about further management options including relevant lab work, treatment,  prognosis, medications and intended side effects was more than 60 minutes. More than 50% of the time was spent on counseling and coordination of care.  I spent a total of 60 minutes on the day of the visit.This includes face to face time and non-face to face time preparing to see the patient (eg, review of tests), Obtaining and/or reviewing separately obtained history,  Documenting clinical information in the electronic or other health record, Independently interpreting resultsand communicating results to the patient/family/caregiver, or Care coordination.

## 2025-03-06 ENCOUNTER — OFFICE VISIT (OUTPATIENT)
Dept: HEMATOLOGY/ONCOLOGY | Facility: CLINIC | Age: 60
End: 2025-03-06
Payer: MEDICARE

## 2025-03-06 VITALS
RESPIRATION RATE: 16 BRPM | SYSTOLIC BLOOD PRESSURE: 99 MMHG | OXYGEN SATURATION: 94 % | WEIGHT: 121.63 LBS | BODY MASS INDEX: 16.12 KG/M2 | TEMPERATURE: 98 F | HEIGHT: 73 IN | HEART RATE: 81 BPM | DIASTOLIC BLOOD PRESSURE: 66 MMHG

## 2025-03-06 DIAGNOSIS — C78.02 MALIGNANT NEOPLASM METASTATIC TO BOTH LUNGS: Primary | ICD-10-CM

## 2025-03-06 DIAGNOSIS — C78.7 SECONDARY MALIGNANT NEOPLASM OF LIVER AND INTRAHEPATIC BILE DUCT: ICD-10-CM

## 2025-03-06 DIAGNOSIS — C78.01 MALIGNANT NEOPLASM METASTATIC TO BOTH LUNGS: Primary | ICD-10-CM

## 2025-03-06 DIAGNOSIS — T66.XXXA RADIATION SICKNESS, UNSPECIFIED, INITIAL ENCOUNTER: ICD-10-CM

## 2025-03-06 DIAGNOSIS — C13.8 MALIGNANT NEOPLASM OF OVERLAPPING SITES OF HYPOPHARYNX: ICD-10-CM

## 2025-03-06 DIAGNOSIS — R91.1 LUNG NODULE: ICD-10-CM

## 2025-03-06 DIAGNOSIS — C13.9 HYPOPHARYNGEAL CANCER: ICD-10-CM

## 2025-03-06 PROCEDURE — 3078F DIAST BP <80 MM HG: CPT | Mod: CPTII,,, | Performed by: NURSE PRACTITIONER

## 2025-03-06 PROCEDURE — G2211 COMPLEX E/M VISIT ADD ON: HCPCS | Mod: S$PBB,,, | Performed by: NURSE PRACTITIONER

## 2025-03-06 PROCEDURE — 3074F SYST BP LT 130 MM HG: CPT | Mod: CPTII,,, | Performed by: NURSE PRACTITIONER

## 2025-03-06 PROCEDURE — 3008F BODY MASS INDEX DOCD: CPT | Mod: CPTII,,, | Performed by: NURSE PRACTITIONER

## 2025-03-06 PROCEDURE — 99215 OFFICE O/P EST HI 40 MIN: CPT | Mod: S$PBB,,, | Performed by: NURSE PRACTITIONER

## 2025-03-06 PROCEDURE — 1159F MED LIST DOCD IN RCRD: CPT | Mod: CPTII,,, | Performed by: NURSE PRACTITIONER

## 2025-03-06 RX ORDER — SODIUM CHLORIDE 0.9 % (FLUSH) 0.9 %
10 SYRINGE (ML) INJECTION
OUTPATIENT
Start: 2025-03-06

## 2025-03-06 RX ORDER — HEPARIN 100 UNIT/ML
500 SYRINGE INTRAVENOUS
OUTPATIENT
Start: 2025-03-06

## 2025-03-06 NOTE — PROGRESS NOTES
MEDICAL ONCOLOGY FOLLOW UP CONSULTATION NOTE    Patient ID: Wallace D Toussaint is a 59 y.o. male.    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  He has trach in place and has been evaluated recently by radiation oncology for concurrent chemoradiation.  He presents to our clinic today for further evaluation.  He has yet to see the dentist for dental clearance prior to starting radiation treatment.  He is accompanied by his brother Pierce as patient no showed previously on 3 different occasions.         Imaging:     PET scan:   No evidence of distant metastatic disease    Past Medical History:   Diagnosis Date    Alcoholic cirrhosis of liver without ascites 06/04/2024    Last Assessment & Plan:    Formatting of this note might be different from the original.   History & Physical patient reports history of cirrhosis currently appears to be on lactulose.  Complicated by possible hepatocellular carcinoma although patient thinks this was a misdiagnosis.  Currently compensated   -Will resume lactulose when able to swallow      Discharge Summary       Follow-up      Cancer of hypopharynx     Patient was diagnosed in June but not sure of the exact date.    Essential (primary) hypertension 06/04/2024    Last Assessment & Plan:    Formatting of this note might be different from the original.   History & Physical well-controlled on admission at 125/65.   Patient on Entresto and metoprolol which we will hold for now until cleared by speech      Discharge Summary blood pressure was well-controlled despite holding his home medications.  Will continue to hold upon discharge and can follow-up with PCP f    History of subdural hematoma 06/04/2024    Last Assessment &  Plan:    Formatting of this note might be different from the original.   History & Physical per chart review.  Patient did not mention this in history      Discharge Summary       Follow-up      Liver cancer     Patient is not sure of the exact date he was diagnosed with this cancer.    Seizure disorder 2024    Last Assessment & Plan:    Formatting of this note might be different from the original.   History & Physical patient reports no seizure in the past 3 years.  Also does not follow with neurology.  Has been on Keppra prescribed by PCP.   -Will continue IV Keppra for now and transition to p.o. when able      Discharge Summary no seizure activity during the hospital stay.  Keppra resumed upon dischar     Family History   Problem Relation Name Age of Onset    Cancer Mother      Stroke Father      Hypertension Father      Hypertension Brother Patrick Toussaint      Social History     Socioeconomic History    Marital status:     Number of children: 0   Tobacco Use    Smoking status: Former     Current packs/day: 0.00     Average packs/day: 1 pack/day for 40.0 years (40.0 ttl pk-yrs)     Types: Cigarettes     Start date:      Quit date: 2020     Years since quittin.1    Smokeless tobacco: Former   Substance and Sexual Activity    Alcohol use: Not Currently    Drug use: Not Currently     Types: Marijuana     Social Drivers of Health     Food Insecurity: No Food Insecurity (2024)    Received from [facility]    Hunger Vital Sign     Worried About Running Out of Food in the Last Year: Never true     Ran Out of Food in the Last Year: Never true   Transportation Needs: No Transportation Needs (2024)    Received from [facility]    PRAPARE - Transportation     Lack of Transportation (Medical): No     Lack of Transportation (Non-Medical): No      Past Surgical History:   Procedure Laterality Date    APPENDECTOMY      Patient is not sure of the exact date but this was done when he was a kid.    TRACHEOTOMY  06/2024    Patient is not sure of the exact date but it was done in june 2024         Review of systems:  Review of Systems   Constitutional:  Positive for activity change. Negative for appetite change, chills, diaphoresis, fatigue and unexpected weight change.   HENT:  Positive for trouble swallowing and voice change. Negative for congestion, facial swelling, hearing loss and mouth sores.    Eyes:  Negative for photophobia, pain, discharge and itching.   Respiratory:  Negative for apnea, cough, choking, chest tightness and shortness of breath.    Cardiovascular:  Negative for chest pain, palpitations and leg swelling.   Gastrointestinal:  Negative for abdominal distention, abdominal pain, anal bleeding and blood in stool.   Endocrine: Negative for cold intolerance, heat intolerance, polydipsia and polyphagia.   Genitourinary:  Negative for difficulty urinating, dysuria, flank pain and hematuria.   Musculoskeletal:  Negative for arthralgias, back pain, joint swelling, myalgias, neck pain and neck stiffness.   Skin:  Negative for color change, pallor and wound.   Allergic/Immunologic: Negative for environmental allergies, food allergies and immunocompromised state.   Neurological:  Negative for dizziness, seizures, facial asymmetry, speech difficulty, light-headedness, numbness and headaches.   Hematological:  Negative for adenopathy. Does not bruise/bleed easily.   Psychiatric/Behavioral:  Negative for agitation, behavioral problems, confusion, decreased concentration and sleep disturbance.               Physical Exam  Vitals and nursing note reviewed.   Constitutional:       General: He is not in acute distress.     Appearance: Normal appearance. He is not ill-appearing.   HENT:      Head: Normocephalic and atraumatic.      Nose: No congestion or  rhinorrhea.   Eyes:      General: No scleral icterus.     Extraocular Movements: Extraocular movements intact.      Pupils: Pupils are equal, round, and reactive to light.   Neck:      Comments: +ve for trach in place  Cardiovascular:      Rate and Rhythm: Normal rate and regular rhythm.      Pulses: Normal pulses.      Heart sounds: Normal heart sounds. No murmur heard.     No gallop.   Pulmonary:      Effort: Pulmonary effort is normal. No respiratory distress.      Breath sounds: Normal breath sounds. No stridor. No wheezing or rhonchi.   Abdominal:      General: Bowel sounds are normal. There is no distension.      Palpations: There is no mass.      Tenderness: There is no abdominal tenderness. There is no guarding.   Musculoskeletal:         General: No swelling, tenderness, deformity or signs of injury. Normal range of motion.      Cervical back: Normal range of motion and neck supple. No rigidity. No muscular tenderness.      Right lower leg: No edema.      Left lower leg: No edema.   Skin:     General: Skin is warm.      Coloration: Skin is not jaundiced or pale.      Findings: No bruising or lesion.   Neurological:      General: No focal deficit present.      Mental Status: He is alert and oriented to person, place, and time.      Cranial Nerves: No cranial nerve deficit.      Sensory: No sensory deficit.      Motor: No weakness.      Gait: Gait normal.   Psychiatric:         Mood and Affect: Mood normal.         Behavior: Behavior normal.         Thought Content: Thought content normal.                 Vitals:    03/06/25 1008   BP: 99/66   Pulse: 81   Resp: 16   Temp: 97.9 °F (36.6 °C)         Body surface area is 1.69 meters squared.    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 P16 negative, PDL1 4%  == PET scan showed no evidence of distant  metastatic disease.   == Recommendation per NCCN guidelines would be for high dose concurrent chemo-XRT. I will send PA for port placement and chemotherapy with weekly Cisplatin 40 mg/m2 to be given with radiation.  ==08/20/24: audiogram ordered for cisplatin, pt had teeth removed and peg has been placed.  Patient here today to discuss upcoming chemotherapy/immunotherapy. An extensive discussion was had which included a thorough discussion of potential side effect profile, risk versus benefits, and expected outcomes.  Risks, including but not limited to, possible hair loss, bone marrow damage, damage to body organs, allergic reactions, sterility, nausea/vomiting, constipation/diarrhea, sores in the mouth, secondary cancers, and rarely death were all discuss.  Specific side effects pertaining to their chemotherapy/immunotherapy medications were discussed as well.  The patient agrees with the plan and all questions and their support systems questions have been answered to their satisfaction.  Premedications were prescribed and patient was educated on appropriate usage.  Patient was educated on when to call, and given the number to call, or to notify the provider including but not limited to:  Persistent nausea and vomiting, dehydration, fever greater than 100.4 lasting over 1 hour induration or isolated feeders greater than 101, rash while on active chemotherapy or immunotherapy, severe pain or new onset pain not controlled by current pain medication regimen.  ==09/09/24: Patient started XRT, did not get labs, drawn today approx 11am, will not be resulted in time to start cisplatin today.  Audiogram reviewed, grossly normal hearing.  Pt is on lactulose, requests refill, will send refill. Educated to hold if diarrhea.  Labs resulted after visit and reviewed, SrCr wnl, 0.84, Magnesium wnl, 1.9. Pt is cleared to start chemotherapy on 9/10/2024.  ==09/19/24: Pt had first chemotherapy on 9/12/24, tolerated well, no nausea.   Trach and peg tube in place functioning well. No complaints, labs reviewed, pt cleared for chemotherapy.  ==10/03/24: Still having some nausea with chemotherapy relieved with ondansetron, no other complaints. Mg 1.6, pt will receive mg in pre and post hydration fluids with cisplatin.  Labs reviewed pt is cleared for chemotherapy.  Visible improvement noted to right neck mass  ==10/10/24: Having some nausea despite ondansetron, will start compazine. Pt complains of fatigue, activity intolerance, continued anemia, iron studies reviewed and are low, pt unable to swallow or tolerate po iron, will send iv iron for approval  ==10/17/24: Pt will complete XRT on 10/25/2024. Coordinated care with XRT and Dr. Barber will order post treatment scans. Pt to receive final cisplatin infusion and start IV iron. Labs reviewed pt cleared for chemotherapy.  == 11/27/24: CT scan for restaging showed significant interval improvement compared to PET/CT dated 8/28/24 with marked decreased size of massive bilateral neck jay disease and also of the very large hypopharyngeal/supraglottic laryngeal primary tumor. Post radiation changes are seen throughout the neck which could limit assessment for any residual disease. Will follow up on future PET scan in 3 months  == 2/18/25: PET scan for restaging showed progressive disease with new onset pulmonary nodules concerning for metastatic disease. Pulmonary/IR biopsy for confirmation. Considering PDL1 +ve, will recommend to start Keytruda, which is category 1 per NCCN  ==02/25/2025:  Patient here today to discuss upcoming chemotherapy/immunotherapy. An extensive discussion was had which included a thorough discussion of potential side effect profile, risk versus benefits, and expected outcomes.  Risks, including but not limited to, possible hair loss, bone marrow damage, damage to body organs, allergic reactions, sterility, nausea/vomiting, constipation/diarrhea, sores in the mouth, secondary  cancers, and rarely death were all discuss.  Specific side effects pertaining to their chemotherapy/immunotherapy medications were discussed as well.  The patient agrees with the plan and all questions and their support systems questions have been answered to their satisfaction.  Premedications were prescribed and patient was educated on appropriate usage.  Patient was educated on when to call, and given the number to call, or to notify the provider including but not limited to:  Persistent nausea and vomiting, dehydration, fever greater than 100.4 lasting over 1 hour induration or isolated feeders greater than 101, rash while on active chemotherapy or immunotherapy, severe pain or new onset pain not controlled by current pain medication regimen.  Per pulm nurse's note patient declined lung nodule biopsy as he can not lay flat feels like it puts pressure on his neck/trach, discussed immunotherapy education with keytruda will start on 3/6/25  ==03/06/2025: Tsh elevated, will repeat with next labs and if elevated start on synthroid, denies complaints, will start keytruda q 3 weeks.      2. H/O Hepatocellular Carcinoma:    == Reported by patient and on his notes from PCP  == Do not have any prior treatment records at this time. He reports being followed in [place]. Will obtain those treatment records from 2020    3. Hypertension  ==continue to follow up with pcp      4. Cancer cachexia  ==Continues to use % with 5-6 cans of nutritional supplement         Plan:  Start palliative immunotherapy with Keytruda   Rtc 3 weeks cbc cmp tsh         Total time spent in counseling and discussion about further management options including relevant lab work, treatment,  prognosis, medications and intended side effects was more than 40 minutes. More than 50% of the time was spent on counseling and coordination of care.  I spent a total of 40 minutes on the day of the visit.This includes face to face time and non-face to  face time preparing to see the patient (eg, review of tests), Obtaining and/or reviewing separately obtained history, Documenting clinical information in the electronic or other health record, Independently interpreting resultsand communicating results to the patient/family/caregiver, or Care coordination.

## 2025-03-27 ENCOUNTER — OFFICE VISIT (OUTPATIENT)
Dept: HEMATOLOGY/ONCOLOGY | Facility: CLINIC | Age: 60
End: 2025-03-27
Payer: MEDICARE

## 2025-03-27 VITALS
BODY MASS INDEX: 16.17 KG/M2 | WEIGHT: 122 LBS | RESPIRATION RATE: 16 BRPM | TEMPERATURE: 97 F | OXYGEN SATURATION: 90 % | SYSTOLIC BLOOD PRESSURE: 98 MMHG | HEIGHT: 73 IN | HEART RATE: 83 BPM | DIASTOLIC BLOOD PRESSURE: 64 MMHG

## 2025-03-27 DIAGNOSIS — Z93.1 GASTROSTOMY STATUS: ICD-10-CM

## 2025-03-27 DIAGNOSIS — C13.9 HYPOPHARYNGEAL CANCER: ICD-10-CM

## 2025-03-27 DIAGNOSIS — C78.02 MALIGNANT NEOPLASM METASTATIC TO BOTH LUNGS: Primary | ICD-10-CM

## 2025-03-27 DIAGNOSIS — R91.1 LUNG NODULE: ICD-10-CM

## 2025-03-27 DIAGNOSIS — C78.01 MALIGNANT NEOPLASM METASTATIC TO BOTH LUNGS: Primary | ICD-10-CM

## 2025-03-27 DIAGNOSIS — C13.8 MALIGNANT NEOPLASM OF OVERLAPPING SITES OF HYPOPHARYNX: ICD-10-CM

## 2025-03-27 DIAGNOSIS — T45.1X5A CHEMOTHERAPY-INDUCED NAUSEA AND VOMITING: ICD-10-CM

## 2025-03-27 DIAGNOSIS — R11.2 CHEMOTHERAPY-INDUCED NAUSEA AND VOMITING: ICD-10-CM

## 2025-03-27 DIAGNOSIS — C78.7 SECONDARY MALIGNANT NEOPLASM OF LIVER AND INTRAHEPATIC BILE DUCT: ICD-10-CM

## 2025-03-27 DIAGNOSIS — E43 SEVERE PROTEIN-CALORIE MALNUTRITION: ICD-10-CM

## 2025-03-27 PROCEDURE — 3078F DIAST BP <80 MM HG: CPT | Mod: CPTII,,, | Performed by: NURSE PRACTITIONER

## 2025-03-27 PROCEDURE — 3074F SYST BP LT 130 MM HG: CPT | Mod: CPTII,,, | Performed by: NURSE PRACTITIONER

## 2025-03-27 PROCEDURE — 3008F BODY MASS INDEX DOCD: CPT | Mod: CPTII,,, | Performed by: NURSE PRACTITIONER

## 2025-03-27 PROCEDURE — G2211 COMPLEX E/M VISIT ADD ON: HCPCS | Mod: S$PBB,,, | Performed by: NURSE PRACTITIONER

## 2025-03-27 PROCEDURE — 1159F MED LIST DOCD IN RCRD: CPT | Mod: CPTII,,, | Performed by: NURSE PRACTITIONER

## 2025-03-27 PROCEDURE — 99215 OFFICE O/P EST HI 40 MIN: CPT | Mod: S$PBB,,, | Performed by: NURSE PRACTITIONER

## 2025-03-27 RX ORDER — LEVOTHYROXINE SODIUM 50 UG/1
50 TABLET ORAL DAILY
Qty: 30 TABLET | Refills: 11 | Status: SHIPPED | OUTPATIENT
Start: 2025-03-27 | End: 2026-03-27

## 2025-03-27 RX ORDER — SODIUM CHLORIDE 0.9 % (FLUSH) 0.9 %
10 SYRINGE (ML) INJECTION
OUTPATIENT
Start: 2025-03-27

## 2025-03-27 RX ORDER — HEPARIN 100 UNIT/ML
500 SYRINGE INTRAVENOUS
OUTPATIENT
Start: 2025-03-27

## 2025-03-27 NOTE — PROGRESS NOTES
MEDICAL ONCOLOGY FOLLOW UP CONSULTATION NOTE    Patient ID: Wallace D Toussaint is a 59 y.o. male.    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  He has trach in place and has been evaluated recently by radiation oncology for concurrent chemoradiation.  He presents to our clinic today for further evaluation.  He has yet to see the dentist for dental clearance prior to starting radiation treatment.  He is accompanied by his brother Pierce as patient no showed previously on 3 different occasions.         Imaging:     PET scan:   No evidence of distant metastatic disease    Past Medical History:   Diagnosis Date    Alcoholic cirrhosis of liver without ascites 06/04/2024    Last Assessment & Plan:    Formatting of this note might be different from the original.   History & Physical patient reports history of cirrhosis currently appears to be on lactulose.  Complicated by possible hepatocellular carcinoma although patient thinks this was a misdiagnosis.  Currently compensated   -Will resume lactulose when able to swallow      Discharge Summary       Follow-up      Cancer of hypopharynx     Patient was diagnosed in June but not sure of the exact date.    Essential (primary) hypertension 06/04/2024    Last Assessment & Plan:    Formatting of this note might be different from the original.   History & Physical well-controlled on admission at 125/65.   Patient on Entresto and metoprolol which we will hold for now until cleared by speech      Discharge Summary blood pressure was well-controlled despite holding his home medications.  Will continue to hold upon discharge and can follow-up with PCP f    History of subdural hematoma 06/04/2024    Last Assessment &  Plan:    Formatting of this note might be different from the original.   History & Physical per chart review.  Patient did not mention this in history      Discharge Summary       Follow-up      Liver cancer     Patient is not sure of the exact date he was diagnosed with this cancer.    Seizure disorder 2024    Last Assessment & Plan:    Formatting of this note might be different from the original.   History & Physical patient reports no seizure in the past 3 years.  Also does not follow with neurology.  Has been on Keppra prescribed by PCP.   -Will continue IV Keppra for now and transition to p.o. when able      Discharge Summary no seizure activity during the hospital stay.  Keppra resumed upon dischar     Family History   Problem Relation Name Age of Onset    Cancer Mother      Stroke Father      Hypertension Father      Hypertension Brother Patrick Toussaint      Social History     Socioeconomic History    Marital status:     Number of children: 0   Tobacco Use    Smoking status: Former     Current packs/day: 0.00     Average packs/day: 1 pack/day for 40.0 years (40.0 ttl pk-yrs)     Types: Cigarettes     Start date:      Quit date: 2020     Years since quittin.2    Smokeless tobacco: Former   Substance and Sexual Activity    Alcohol use: Not Currently    Drug use: Not Currently     Types: Marijuana     Social Drivers of Health     Food Insecurity: No Food Insecurity (2024)    Received from [facility]    Hunger Vital Sign     Worried About Running Out of Food in the Last Year: Never true     Ran Out of Food in the Last Year: Never true   Transportation Needs: No Transportation Needs (2024)    Received from [facility]    PRAPARE - Transportation     Lack of Transportation (Medical): No     Lack of Transportation (Non-Medical): No      Past Surgical History:   Procedure Laterality Date    APPENDECTOMY      Patient is not sure of the exact date but this was done when he was a kid.    TRACHEOTOMY  06/2024    Patient is not sure of the exact date but it was done in june 2024         Review of systems:  Review of Systems   Constitutional:  Positive for activity change. Negative for appetite change, chills, diaphoresis, fatigue and unexpected weight change.   HENT:  Positive for trouble swallowing and voice change. Negative for congestion, facial swelling, hearing loss and mouth sores.    Eyes:  Negative for photophobia, pain, discharge and itching.   Respiratory:  Negative for apnea, cough, choking, chest tightness and shortness of breath.    Cardiovascular:  Negative for chest pain, palpitations and leg swelling.   Gastrointestinal:  Negative for abdominal distention, abdominal pain, anal bleeding and blood in stool.   Endocrine: Negative for cold intolerance, heat intolerance, polydipsia and polyphagia.   Genitourinary:  Negative for difficulty urinating, dysuria, flank pain and hematuria.   Musculoskeletal:  Negative for arthralgias, back pain, joint swelling, myalgias, neck pain and neck stiffness.   Skin:  Negative for color change, pallor and wound.   Allergic/Immunologic: Negative for environmental allergies, food allergies and immunocompromised state.   Neurological:  Negative for dizziness, seizures, facial asymmetry, speech difficulty, light-headedness, numbness and headaches.   Hematological:  Negative for adenopathy. Does not bruise/bleed easily.   Psychiatric/Behavioral:  Negative for agitation, behavioral problems, confusion, decreased concentration and sleep disturbance.               Physical Exam  Vitals and nursing note reviewed.   Constitutional:       General: He is not in acute distress.     Appearance: Normal appearance. He is not ill-appearing.   HENT:      Head: Normocephalic and atraumatic.      Nose: No congestion or  rhinorrhea.   Eyes:      General: No scleral icterus.     Extraocular Movements: Extraocular movements intact.      Pupils: Pupils are equal, round, and reactive to light.   Neck:      Comments: +ve for trach in place  Cardiovascular:      Rate and Rhythm: Normal rate and regular rhythm.      Pulses: Normal pulses.      Heart sounds: Normal heart sounds. No murmur heard.     No gallop.   Pulmonary:      Effort: Pulmonary effort is normal. No respiratory distress.      Breath sounds: Normal breath sounds. No stridor. No wheezing or rhonchi.   Abdominal:      General: Bowel sounds are normal. There is no distension.      Palpations: There is no mass.      Tenderness: There is no abdominal tenderness. There is no guarding.   Musculoskeletal:         General: No swelling, tenderness, deformity or signs of injury. Normal range of motion.      Cervical back: Normal range of motion and neck supple. No rigidity. No muscular tenderness.      Right lower leg: No edema.      Left lower leg: No edema.   Skin:     General: Skin is warm.      Coloration: Skin is not jaundiced or pale.      Findings: No bruising or lesion.   Neurological:      General: No focal deficit present.      Mental Status: He is alert and oriented to person, place, and time.      Cranial Nerves: No cranial nerve deficit.      Sensory: No sensory deficit.      Motor: No weakness.      Gait: Gait normal.   Psychiatric:         Mood and Affect: Mood normal.         Behavior: Behavior normal.         Thought Content: Thought content normal.                 Vitals:    03/27/25 0944   BP: 98/64   Pulse: 83   Resp: 16   Temp: 97.4 °F (36.3 °C)         Body surface area is 1.69 meters squared.    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 P16 negative, PDL1 4%  == PET scan showed no evidence of distant  metastatic disease.   == Recommendation per NCCN guidelines would be for high dose concurrent chemo-XRT. I will send PA for port placement and chemotherapy with weekly Cisplatin 40 mg/m2 to be given with radiation.  ==08/20/24: audiogram ordered for cisplatin, pt had teeth removed and peg has been placed.  Patient here today to discuss upcoming chemotherapy/immunotherapy. An extensive discussion was had which included a thorough discussion of potential side effect profile, risk versus benefits, and expected outcomes.  Risks, including but not limited to, possible hair loss, bone marrow damage, damage to body organs, allergic reactions, sterility, nausea/vomiting, constipation/diarrhea, sores in the mouth, secondary cancers, and rarely death were all discuss.  Specific side effects pertaining to their chemotherapy/immunotherapy medications were discussed as well.  The patient agrees with the plan and all questions and their support systems questions have been answered to their satisfaction.  Premedications were prescribed and patient was educated on appropriate usage.  Patient was educated on when to call, and given the number to call, or to notify the provider including but not limited to:  Persistent nausea and vomiting, dehydration, fever greater than 100.4 lasting over 1 hour induration or isolated feeders greater than 101, rash while on active chemotherapy or immunotherapy, severe pain or new onset pain not controlled by current pain medication regimen.  ==09/09/24: Patient started XRT, did not get labs, drawn today approx 11am, will not be resulted in time to start cisplatin today.  Audiogram reviewed, grossly normal hearing.  Pt is on lactulose, requests refill, will send refill. Educated to hold if diarrhea.  Labs resulted after visit and reviewed, SrCr wnl, 0.84, Magnesium wnl, 1.9. Pt is cleared to start chemotherapy on 9/10/2024.  ==09/19/24: Pt had first chemotherapy on 9/12/24, tolerated well, no nausea.   Trach and peg tube in place functioning well. No complaints, labs reviewed, pt cleared for chemotherapy.  ==10/03/24: Still having some nausea with chemotherapy relieved with ondansetron, no other complaints. Mg 1.6, pt will receive mg in pre and post hydration fluids with cisplatin.  Labs reviewed pt is cleared for chemotherapy.  Visible improvement noted to right neck mass  ==10/10/24: Having some nausea despite ondansetron, will start compazine. Pt complains of fatigue, activity intolerance, continued anemia, iron studies reviewed and are low, pt unable to swallow or tolerate po iron, will send iv iron for approval  ==10/17/24: Pt will complete XRT on 10/25/2024. Coordinated care with XRT and Dr. Barber will order post treatment scans. Pt to receive final cisplatin infusion and start IV iron. Labs reviewed pt cleared for chemotherapy.  == 11/27/24: CT scan for restaging showed significant interval improvement compared to PET/CT dated 8/28/24 with marked decreased size of massive bilateral neck jay disease and also of the very large hypopharyngeal/supraglottic laryngeal primary tumor. Post radiation changes are seen throughout the neck which could limit assessment for any residual disease. Will follow up on future PET scan in 3 months  == 2/18/25: PET scan for restaging showed progressive disease with new onset pulmonary nodules concerning for metastatic disease. Pulmonary/IR biopsy for confirmation. Considering PDL1 +ve, will recommend to start Keytruda, which is category 1 per NCCN  ==02/25/2025:  Patient here today to discuss upcoming chemotherapy/immunotherapy. An extensive discussion was had which included a thorough discussion of potential side effect profile, risk versus benefits, and expected outcomes.  Risks, including but not limited to, possible hair loss, bone marrow damage, damage to body organs, allergic reactions, sterility, nausea/vomiting, constipation/diarrhea, sores in the mouth, secondary  cancers, and rarely death were all discuss.  Specific side effects pertaining to their chemotherapy/immunotherapy medications were discussed as well.  The patient agrees with the plan and all questions and their support systems questions have been answered to their satisfaction.  Premedications were prescribed and patient was educated on appropriate usage.  Patient was educated on when to call, and given the number to call, or to notify the provider including but not limited to:  Persistent nausea and vomiting, dehydration, fever greater than 100.4 lasting over 1 hour induration or isolated feeders greater than 101, rash while on active chemotherapy or immunotherapy, severe pain or new onset pain not controlled by current pain medication regimen.  Per pulm nurse's note patient declined lung nodule biopsy as he can not lay flat feels like it puts pressure on his neck/trach, discussed immunotherapy education with keytruda will start on 3/6/25  ==03/06/2025: Tsh elevated, will repeat with next labs and if elevated start on synthroid, denies complaints, will start keytruda q 3 weeks.  ==03/27/2025: tolerating keyruda well, TSH elevated, will start on synthroid. Labs reviewed, pt is cleared for immunotherapy      2. H/O Hepatocellular Carcinoma:    == Reported by patient and on his notes from PCP  == Do not have any prior treatment records at this time. He reports being followed in [place]. Will obtain those treatment records from 2020    3. Hypertension  ==continue to follow up with pcp      4. Cancer cachexia  ==Continues to use % with 5-6 cans of nutritional supplement         Plan:  continue palliative immunotherapy with Keytruda   Rtc 3 weeks cbc cmp tsh         Total time spent in counseling and discussion about further management options including relevant lab work, treatment,  prognosis, medications and intended side effects was more than 40 minutes. More than 50% of the time was spent on counseling  and coordination of care.  I spent a total of 40 minutes on the day of the visit.This includes face to face time and non-face to face time preparing to see the patient (eg, review of tests), Obtaining and/or reviewing separately obtained history, Documenting clinical information in the electronic or other health record, Independently interpreting resultsand communicating results to the patient/family/caregiver, or Care coordination.

## 2025-04-17 ENCOUNTER — OFFICE VISIT (OUTPATIENT)
Dept: HEMATOLOGY/ONCOLOGY | Facility: CLINIC | Age: 60
End: 2025-04-17
Payer: MEDICARE

## 2025-04-17 VITALS
BODY MASS INDEX: 16.24 KG/M2 | RESPIRATION RATE: 16 BRPM | DIASTOLIC BLOOD PRESSURE: 73 MMHG | HEART RATE: 88 BPM | TEMPERATURE: 98 F | OXYGEN SATURATION: 90 % | HEIGHT: 73 IN | SYSTOLIC BLOOD PRESSURE: 115 MMHG | WEIGHT: 122.5 LBS

## 2025-04-17 DIAGNOSIS — Z93.1 GASTROSTOMY STATUS: ICD-10-CM

## 2025-04-17 DIAGNOSIS — E03.9 HYPOTHYROIDISM, UNSPECIFIED TYPE: ICD-10-CM

## 2025-04-17 DIAGNOSIS — C78.02 MALIGNANT NEOPLASM METASTATIC TO BOTH LUNGS: ICD-10-CM

## 2025-04-17 DIAGNOSIS — C13.9 HYPOPHARYNGEAL CANCER: ICD-10-CM

## 2025-04-17 DIAGNOSIS — C13.8 MALIGNANT NEOPLASM OF OVERLAPPING SITES OF HYPOPHARYNX: Primary | ICD-10-CM

## 2025-04-17 DIAGNOSIS — E43 SEVERE PROTEIN-CALORIE MALNUTRITION: ICD-10-CM

## 2025-04-17 DIAGNOSIS — C78.01 MALIGNANT NEOPLASM METASTATIC TO BOTH LUNGS: ICD-10-CM

## 2025-04-17 DIAGNOSIS — D50.0 ANEMIA DUE TO CHRONIC BLOOD LOSS: ICD-10-CM

## 2025-04-17 DIAGNOSIS — C78.7 SECONDARY MALIGNANT NEOPLASM OF LIVER AND INTRAHEPATIC BILE DUCT: ICD-10-CM

## 2025-04-17 LAB
FERRITIN: 106 NG/ML (ref 20–380)
IRON BINDING CAPACITY: 249 UG/DL (ref 262–472)
IRON SERPL-MCNC: 37 UG/DL (ref 59–158)
UIBC SERPL-MCNC: 212 UG/DL (ref 112–346)

## 2025-04-17 PROCEDURE — 3078F DIAST BP <80 MM HG: CPT | Mod: CPTII,,, | Performed by: NURSE PRACTITIONER

## 2025-04-17 PROCEDURE — 3074F SYST BP LT 130 MM HG: CPT | Mod: CPTII,,, | Performed by: NURSE PRACTITIONER

## 2025-04-17 PROCEDURE — G2211 COMPLEX E/M VISIT ADD ON: HCPCS | Mod: S$PBB,,, | Performed by: NURSE PRACTITIONER

## 2025-04-17 PROCEDURE — 99215 OFFICE O/P EST HI 40 MIN: CPT | Mod: S$PBB,,, | Performed by: NURSE PRACTITIONER

## 2025-04-17 PROCEDURE — 1159F MED LIST DOCD IN RCRD: CPT | Mod: CPTII,,, | Performed by: NURSE PRACTITIONER

## 2025-04-17 PROCEDURE — 3008F BODY MASS INDEX DOCD: CPT | Mod: CPTII,,, | Performed by: NURSE PRACTITIONER

## 2025-04-17 RX ORDER — SODIUM CHLORIDE 0.9 % (FLUSH) 0.9 %
10 SYRINGE (ML) INJECTION
OUTPATIENT
Start: 2025-04-17

## 2025-04-17 RX ORDER — HEPARIN 100 UNIT/ML
500 SYRINGE INTRAVENOUS
OUTPATIENT
Start: 2025-04-17

## 2025-04-17 NOTE — PROGRESS NOTES
MEDICAL ONCOLOGY FOLLOW UP CONSULTATION NOTE    Patient ID: Wallace D Toussaint is a 59 y.o. male.    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  He has trach in place and has been evaluated recently by radiation oncology for concurrent chemoradiation.  He presents to our clinic today for further evaluation.  He has yet to see the dentist for dental clearance prior to starting radiation treatment.  He is accompanied by his brother Pierce as patient no showed previously on 3 different occasions.         Imaging:     PET scan:   No evidence of distant metastatic disease    Past Medical History:   Diagnosis Date    Alcoholic cirrhosis of liver without ascites 06/04/2024    Last Assessment & Plan:    Formatting of this note might be different from the original.   History & Physical patient reports history of cirrhosis currently appears to be on lactulose.  Complicated by possible hepatocellular carcinoma although patient thinks this was a misdiagnosis.  Currently compensated   -Will resume lactulose when able to swallow      Discharge Summary       Follow-up      Cancer of hypopharynx     Patient was diagnosed in June but not sure of the exact date.    Essential (primary) hypertension 06/04/2024    Last Assessment & Plan:    Formatting of this note might be different from the original.   History & Physical well-controlled on admission at 125/65.   Patient on Entresto and metoprolol which we will hold for now until cleared by speech      Discharge Summary blood pressure was well-controlled despite holding his home medications.  Will continue to hold upon discharge and can follow-up with PCP f    History of subdural hematoma 06/04/2024    Last Assessment &  Plan:    Formatting of this note might be different from the original.   History & Physical per chart review.  Patient did not mention this in history      Discharge Summary       Follow-up      Liver cancer     Patient is not sure of the exact date he was diagnosed with this cancer.    Seizure disorder 2024    Last Assessment & Plan:    Formatting of this note might be different from the original.   History & Physical patient reports no seizure in the past 3 years.  Also does not follow with neurology.  Has been on Keppra prescribed by PCP.   -Will continue IV Keppra for now and transition to p.o. when able      Discharge Summary no seizure activity during the hospital stay.  Keppra resumed upon dischar     Family History   Problem Relation Name Age of Onset    Cancer Mother      Stroke Father      Hypertension Father      Hypertension Brother Patrick Toussaint      Social History     Socioeconomic History    Marital status:     Number of children: 0   Tobacco Use    Smoking status: Former     Current packs/day: 0.00     Average packs/day: 1 pack/day for 40.0 years (40.0 ttl pk-yrs)     Types: Cigarettes     Start date:      Quit date: 2020     Years since quittin.2    Smokeless tobacco: Former   Substance and Sexual Activity    Alcohol use: Not Currently    Drug use: Not Currently     Types: Marijuana     Social Drivers of Health     Food Insecurity: No Food Insecurity (2024)    Received from [facility] Subsidi[facility] and [facility]    Hunger Vital Sign     Worried About Running Out of Food in the Last Year: Never true     Ran Out of Food in the Last Year: Never true   Transportation Needs: No Transportation Needs (2024)    Received from [facility]    PRAPARE - Transportation     Lack of Transportation (Medical): No     Lack of Transportation (Non-Medical): No      Past Surgical History:   Procedure Laterality Date    APPENDECTOMY      Patient is not sure of the exact date but this was done when he was a kid.    TRACHEOTOMY  06/2024    Patient is not sure of the exact date but it was done in june 2024         Review of systems:  Review of Systems   Constitutional:  Positive for activity change. Negative for appetite change, chills, diaphoresis, fatigue and unexpected weight change.   HENT:  Positive for trouble swallowing and voice change. Negative for congestion, facial swelling, hearing loss and mouth sores.    Eyes:  Negative for photophobia, pain, discharge and itching.   Respiratory:  Negative for apnea, cough, choking, chest tightness and shortness of breath.    Cardiovascular:  Negative for chest pain, palpitations and leg swelling.   Gastrointestinal:  Negative for abdominal distention, abdominal pain, anal bleeding and blood in stool.   Endocrine: Negative for cold intolerance, heat intolerance, polydipsia and polyphagia.   Genitourinary:  Negative for difficulty urinating, dysuria, flank pain and hematuria.   Musculoskeletal:  Negative for arthralgias, back pain, joint swelling, myalgias, neck pain and neck stiffness.   Skin:  Negative for color change, pallor and wound.   Allergic/Immunologic: Negative for environmental allergies, food allergies and immunocompromised state.   Neurological:  Negative for dizziness, seizures, facial asymmetry, speech difficulty, light-headedness, numbness and headaches.   Hematological:  Negative for adenopathy. Does not bruise/bleed easily.   Psychiatric/Behavioral:  Negative for agitation, behavioral problems, confusion, decreased concentration and sleep disturbance.               Physical Exam  Vitals and nursing note reviewed.   Constitutional:       General: He is not in acute distress.     Appearance: Normal appearance. He is not ill-appearing.   HENT:      Head: Normocephalic and atraumatic.      Nose: No congestion or  rhinorrhea.   Eyes:      General: No scleral icterus.     Extraocular Movements: Extraocular movements intact.      Pupils: Pupils are equal, round, and reactive to light.   Neck:      Comments: +ve for trach in place  Cardiovascular:      Rate and Rhythm: Normal rate and regular rhythm.      Pulses: Normal pulses.      Heart sounds: Normal heart sounds. No murmur heard.     No gallop.   Pulmonary:      Effort: Pulmonary effort is normal. No respiratory distress.      Breath sounds: Normal breath sounds. No stridor. No wheezing or rhonchi.   Abdominal:      General: Bowel sounds are normal. There is no distension.      Palpations: There is no mass.      Tenderness: There is no abdominal tenderness. There is no guarding.   Musculoskeletal:         General: No swelling, tenderness, deformity or signs of injury. Normal range of motion.      Cervical back: Normal range of motion and neck supple. No rigidity. No muscular tenderness.      Right lower leg: No edema.      Left lower leg: No edema.   Skin:     General: Skin is warm.      Coloration: Skin is not jaundiced or pale.      Findings: No bruising or lesion.   Neurological:      General: No focal deficit present.      Mental Status: He is alert and oriented to person, place, and time.      Cranial Nerves: No cranial nerve deficit.      Sensory: No sensory deficit.      Motor: No weakness.      Gait: Gait normal.   Psychiatric:         Mood and Affect: Mood normal.         Behavior: Behavior normal.         Thought Content: Thought content normal.                 There were no vitals filed for this visit.        There is no height or weight on file to calculate BSA.    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 P16 negative, PDL1 4%  == PET scan showed no evidence of distant metastatic disease.   ==  Recommendation per NCCN guidelines would be for high dose concurrent chemo-XRT. I will send PA for port placement and chemotherapy with weekly Cisplatin 40 mg/m2 to be given with radiation.  ==08/20/24: audiogram ordered for cisplatin, pt had teeth removed and peg has been placed.  Patient here today to discuss upcoming chemotherapy/immunotherapy. An extensive discussion was had which included a thorough discussion of potential side effect profile, risk versus benefits, and expected outcomes.  Risks, including but not limited to, possible hair loss, bone marrow damage, damage to body organs, allergic reactions, sterility, nausea/vomiting, constipation/diarrhea, sores in the mouth, secondary cancers, and rarely death were all discuss.  Specific side effects pertaining to their chemotherapy/immunotherapy medications were discussed as well.  The patient agrees with the plan and all questions and their support systems questions have been answered to their satisfaction.  Premedications were prescribed and patient was educated on appropriate usage.  Patient was educated on when to call, and given the number to call, or to notify the provider including but not limited to:  Persistent nausea and vomiting, dehydration, fever greater than 100.4 lasting over 1 hour induration or isolated feeders greater than 101, rash while on active chemotherapy or immunotherapy, severe pain or new onset pain not controlled by current pain medication regimen.  ==09/09/24: Patient started XRT, did not get labs, drawn today approx 11am, will not be resulted in time to start cisplatin today.  Audiogram reviewed, grossly normal hearing.  Pt is on lactulose, requests refill, will send refill. Educated to hold if diarrhea.  Labs resulted after visit and reviewed, SrCr wnl, 0.84, Magnesium wnl, 1.9. Pt is cleared to start chemotherapy on 9/10/2024.  ==09/19/24: Pt had first chemotherapy on 9/12/24, tolerated well, no nausea.  Trach and peg tube in  place functioning well. No complaints, labs reviewed, pt cleared for chemotherapy.  ==10/03/24: Still having some nausea with chemotherapy relieved with ondansetron, no other complaints. Mg 1.6, pt will receive mg in pre and post hydration fluids with cisplatin.  Labs reviewed pt is cleared for chemotherapy.  Visible improvement noted to right neck mass  ==10/10/24: Having some nausea despite ondansetron, will start compazine. Pt complains of fatigue, activity intolerance, continued anemia, iron studies reviewed and are low, pt unable to swallow or tolerate po iron, will send iv iron for approval  ==10/17/24: Pt will complete XRT on 10/25/2024. Coordinated care with XRT and Dr. Barber will order post treatment scans. Pt to receive final cisplatin infusion and start IV iron. Labs reviewed pt cleared for chemotherapy.  == 11/27/24: CT scan for restaging showed significant interval improvement compared to PET/CT dated 8/28/24 with marked decreased size of massive bilateral neck jay disease and also of the very large hypopharyngeal/supraglottic laryngeal primary tumor. Post radiation changes are seen throughout the neck which could limit assessment for any residual disease. Will follow up on future PET scan in 3 months  == 2/18/25: PET scan for restaging showed progressive disease with new onset pulmonary nodules concerning for metastatic disease. Pulmonary/IR biopsy for confirmation. Considering PDL1 +ve, will recommend to start Keytruda, which is category 1 per NCCN  ==02/25/2025:  Patient here today to discuss upcoming chemotherapy/immunotherapy. An extensive discussion was had which included a thorough discussion of potential side effect profile, risk versus benefits, and expected outcomes.  Risks, including but not limited to, possible hair loss, bone marrow damage, damage to body organs, allergic reactions, sterility, nausea/vomiting, constipation/diarrhea, sores in the mouth, secondary cancers, and rarely death  were all discuss.  Specific side effects pertaining to their chemotherapy/immunotherapy medications were discussed as well.  The patient agrees with the plan and all questions and their support systems questions have been answered to their satisfaction.  Premedications were prescribed and patient was educated on appropriate usage.  Patient was educated on when to call, and given the number to call, or to notify the provider including but not limited to:  Persistent nausea and vomiting, dehydration, fever greater than 100.4 lasting over 1 hour induration or isolated feeders greater than 101, rash while on active chemotherapy or immunotherapy, severe pain or new onset pain not controlled by current pain medication regimen.  Per pulm nurse's note patient declined lung nodule biopsy as he can not lay flat feels like it puts pressure on his neck/trach, discussed immunotherapy education with keytruda will start on 3/6/25  ==03/06/2025: Tsh elevated, will repeat with next labs and if elevated start on synthroid, denies complaints, will start keytruda q 3 weeks.  ==03/27/2025: tolerating keyruda well, TSH elevated, will start on synthroid. Labs reviewed, pt is cleared for immunotherapy  ==04/17/2025: Tsh remains elevated but is improved, started on synthroid 3 weeks ago, due for restaging scans will order. Labs reviewed pt is cleared for immunotherapy.  Pt declines restaging scan at this time, reports some fatigue and sluggishness will check iron studies      2. H/O Hepatocellular Carcinoma:    == Reported by patient and on his notes from PCP  == Do not have any prior treatment records at this time. He reports being followed in [place]. Will obtain those treatment records from 2020    3. Hypertension  ==continue to follow up with pcp      4. Cancer cachexia  ==Continues to use % with 5-6 cans of nutritional supplement         Plan:  continue palliative immunotherapy with Keytruda   Rtc 3 weeks cbc cmp tsh  Pet  scan for restaging - pt declines       Total time spent in counseling and discussion about further management options including relevant lab work, treatment,  prognosis, medications and intended side effects was more than 40 minutes. More than 50% of the time was spent on counseling and coordination of care.  I spent a total of 40 minutes on the day of the visit.This includes face to face time and non-face to face time preparing to see the patient (eg, review of tests), Obtaining and/or reviewing separately obtained history, Documenting clinical information in the electronic or other health record, Independently interpreting resultsand communicating results to the patient/family/caregiver, or Care coordination.

## 2025-05-07 ENCOUNTER — TELEPHONE (OUTPATIENT)
Dept: HEMATOLOGY/ONCOLOGY | Facility: CLINIC | Age: 60
End: 2025-05-07

## 2025-05-07 NOTE — TELEPHONE ENCOUNTER
----- Message from Rupa sent at 5/7/2025  8:20 AM CDT -----  Contact: beryl  Type:  Reschedule apptWho Called:Slickes the patient know what this is regarding?:reschedule labs due to floodingWould the patient rather a call back or a response via [facility]? Be Call Back Number:[phone] Information: n/a

## 2025-05-07 NOTE — TELEPHONE ENCOUNTER
Copied from CRM #[id number]. Topic: Appointments - Appointment Rescheduling  >> May 7, 2025  8:30 AM Viviana wrote:  Type:  Needs Medical Advice    Who Called: pt  Symptoms (please be specific): na   How long has patient had these symptoms:  na  Pharmacy name and phone #:  na  Would the patient rather a call back or a response via [facility]? call  Best Call Back Number: [phone]  Additional Information: requesting to reschedule appts due to weather

## 2025-05-07 NOTE — TELEPHONE ENCOUNTER
----- [facility] sent at 5/7/2025  8:34 AM CDT -----  Contact: Pierce/  .Type:  Patient Returning CallWho Called:Pierce/Miguelina Left Message for Patient:Larisa Rogers MA Does the patient know what this is regarding?:missed call Would the patient rather a call back or a response via [facility]? Call Best Call Back Number:[phone] Information:

## 2025-05-15 ENCOUNTER — OFFICE VISIT (OUTPATIENT)
Dept: HEMATOLOGY/ONCOLOGY | Facility: CLINIC | Age: 60
End: 2025-05-15
Payer: MEDICARE

## 2025-05-15 VITALS
BODY MASS INDEX: 17.05 KG/M2 | TEMPERATURE: 98 F | WEIGHT: 128.63 LBS | SYSTOLIC BLOOD PRESSURE: 95 MMHG | RESPIRATION RATE: 16 BRPM | OXYGEN SATURATION: 90 % | DIASTOLIC BLOOD PRESSURE: 61 MMHG | HEIGHT: 73 IN | HEART RATE: 92 BPM

## 2025-05-15 DIAGNOSIS — D50.0 ANEMIA DUE TO CHRONIC BLOOD LOSS: ICD-10-CM

## 2025-05-15 DIAGNOSIS — C78.01 MALIGNANT NEOPLASM METASTATIC TO BOTH LUNGS: ICD-10-CM

## 2025-05-15 DIAGNOSIS — C13.9 HYPOPHARYNGEAL CANCER: ICD-10-CM

## 2025-05-15 DIAGNOSIS — C78.02 MALIGNANT NEOPLASM METASTATIC TO BOTH LUNGS: ICD-10-CM

## 2025-05-15 DIAGNOSIS — E03.9 HYPOTHYROIDISM, UNSPECIFIED TYPE: ICD-10-CM

## 2025-05-15 DIAGNOSIS — C13.8 MALIGNANT NEOPLASM OF OVERLAPPING SITES OF HYPOPHARYNX: Primary | ICD-10-CM

## 2025-05-15 DIAGNOSIS — Z93.1 GASTROSTOMY STATUS: ICD-10-CM

## 2025-05-15 PROCEDURE — G2211 COMPLEX E/M VISIT ADD ON: HCPCS | Mod: S$PBB,,, | Performed by: NURSE PRACTITIONER

## 2025-05-15 PROCEDURE — 3074F SYST BP LT 130 MM HG: CPT | Mod: CPTII,,, | Performed by: NURSE PRACTITIONER

## 2025-05-15 PROCEDURE — 3078F DIAST BP <80 MM HG: CPT | Mod: CPTII,,, | Performed by: NURSE PRACTITIONER

## 2025-05-15 PROCEDURE — 1159F MED LIST DOCD IN RCRD: CPT | Mod: CPTII,,, | Performed by: NURSE PRACTITIONER

## 2025-05-15 PROCEDURE — 3008F BODY MASS INDEX DOCD: CPT | Mod: CPTII,,, | Performed by: NURSE PRACTITIONER

## 2025-05-15 PROCEDURE — 99215 OFFICE O/P EST HI 40 MIN: CPT | Mod: S$PBB,,, | Performed by: NURSE PRACTITIONER

## 2025-05-15 RX ORDER — SODIUM CHLORIDE 0.9 % (FLUSH) 0.9 %
10 SYRINGE (ML) INJECTION
OUTPATIENT
Start: 2025-05-15

## 2025-05-15 RX ORDER — HEPARIN 100 UNIT/ML
500 SYRINGE INTRAVENOUS
OUTPATIENT
Start: 2025-05-15

## 2025-05-15 NOTE — PROGRESS NOTES
MEDICAL ONCOLOGY FOLLOW UP CONSULTATION NOTE    Patient ID: Wallace D Toussaint is a 59 y.o. male.    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  He has trach in place and has been evaluated recently by radiation oncology for concurrent chemoradiation.  He presents to our clinic today for further evaluation.  He has yet to see the dentist for dental clearance prior to starting radiation treatment.  He is accompanied by his brother Pierce as patient no showed previously on 3 different occasions.         Imaging:     PET scan:   No evidence of distant metastatic disease    Past Medical History:   Diagnosis Date    Alcoholic cirrhosis of liver without ascites 06/04/2024    Last Assessment & Plan:    Formatting of this note might be different from the original.   History & Physical patient reports history of cirrhosis currently appears to be on lactulose.  Complicated by possible hepatocellular carcinoma although patient thinks this was a misdiagnosis.  Currently compensated   -Will resume lactulose when able to swallow      Discharge Summary       Follow-up      Cancer of hypopharynx     Patient was diagnosed in June but not sure of the exact date.    Essential (primary) hypertension 06/04/2024    Last Assessment & Plan:    Formatting of this note might be different from the original.   History & Physical well-controlled on admission at 125/65.   Patient on Entresto and metoprolol which we will hold for now until cleared by speech      Discharge Summary blood pressure was well-controlled despite holding his home medications.  Will continue to hold upon discharge and can follow-up with PCP f    History of subdural hematoma 06/04/2024    Last Assessment &  Plan:    Formatting of this note might be different from the original.   History & Physical per chart review.  Patient did not mention this in history      Discharge Summary       Follow-up      Liver cancer     Patient is not sure of the exact date he was diagnosed with this cancer.    Seizure disorder 2024    Last Assessment & Plan:    Formatting of this note might be different from the original.   History & Physical patient reports no seizure in the past 3 years.  Also does not follow with neurology.  Has been on Keppra prescribed by PCP.   -Will continue IV Keppra for now and transition to p.o. when able      Discharge Summary no seizure activity during the hospital stay.  Keppra resumed upon dischar     Family History   Problem Relation Name Age of Onset    Cancer Mother      Stroke Father      Hypertension Father      Hypertension Brother Patrick Toussaint      Social History     Socioeconomic History    Marital status:     Number of children: 0   Tobacco Use    Smoking status: Former     Current packs/day: 0.00     Average packs/day: 1 pack/day for 40.0 years (40.0 ttl pk-yrs)     Types: Cigarettes     Start date:      Quit date: 2020     Years since quittin.3    Smokeless tobacco: Former   Substance and Sexual Activity    Alcohol use: Not Currently    Drug use: Not Currently     Types: Marijuana     Social Drivers of Health     Food Insecurity: No Food Insecurity (2024)    Received from [facility] and Its Subsidiaries and Affiliates    Hunger Vital Sign     Worried About Running Out of Food in the Last Year: Never true     Ran Out of Food in the Last Year: Never true   Transportation Needs: No Transportation Needs (2024)    Received from [facility] Subsidi[facility] and [facility]iliates    PRAPARE - Transportation     Lack of Transportation (Medical): No     Lack of Transportation (Non-Medical): No      Past Surgical History:   Procedure Laterality Date    APPENDECTOMY      Patient is not sure of the exact date but this was done when he was a kid.    TRACHEOTOMY  06/2024    Patient is not sure of the exact date but it was done in june 2024         Review of systems:  Review of Systems   Constitutional:  Positive for activity change. Negative for appetite change, chills, diaphoresis, fatigue and unexpected weight change.   HENT:  Positive for trouble swallowing and voice change. Negative for congestion, facial swelling, hearing loss and mouth sores.    Eyes:  Negative for photophobia, pain, discharge and itching.   Respiratory:  Negative for apnea, cough, choking, chest tightness and shortness of breath.    Cardiovascular:  Negative for chest pain, palpitations and leg swelling.   Gastrointestinal:  Negative for abdominal distention, abdominal pain, anal bleeding and blood in stool.   Endocrine: Negative for cold intolerance, heat intolerance, polydipsia and polyphagia.   Genitourinary:  Negative for difficulty urinating, dysuria, flank pain and hematuria.   Musculoskeletal:  Negative for arthralgias, back pain, joint swelling, myalgias, neck pain and neck stiffness.   Skin:  Negative for color change, pallor and wound.   Allergic/Immunologic: Negative for environmental allergies, food allergies and immunocompromised state.   Neurological:  Negative for dizziness, seizures, facial asymmetry, speech difficulty, light-headedness, numbness and headaches.   Hematological:  Negative for adenopathy. Does not bruise/bleed easily.   Psychiatric/Behavioral:  Negative for agitation, behavioral problems, confusion, decreased concentration and sleep disturbance.               Physical Exam  Vitals and nursing note reviewed.   Constitutional:       General: He is not in acute distress.     Appearance: Normal appearance. He is not ill-appearing.   HENT:      Head: Normocephalic and atraumatic.      Nose: No congestion or  rhinorrhea.   Eyes:      General: No scleral icterus.     Extraocular Movements: Extraocular movements intact.      Pupils: Pupils are equal, round, and reactive to light.   Neck:      Comments: +ve for trach in place  Cardiovascular:      Rate and Rhythm: Normal rate and regular rhythm.      Pulses: Normal pulses.      Heart sounds: Normal heart sounds. No murmur heard.     No gallop.   Pulmonary:      Effort: Pulmonary effort is normal. No respiratory distress.      Breath sounds: Normal breath sounds. No stridor. No wheezing or rhonchi.   Abdominal:      General: Bowel sounds are normal. There is no distension.      Palpations: There is no mass.      Tenderness: There is no abdominal tenderness. There is no guarding.   Musculoskeletal:         General: No swelling, tenderness, deformity or signs of injury. Normal range of motion.      Cervical back: Normal range of motion and neck supple. No rigidity. No muscular tenderness.      Right lower leg: No edema.      Left lower leg: No edema.   Skin:     General: Skin is warm.      Coloration: Skin is not jaundiced or pale.      Findings: No bruising or lesion.   Neurological:      General: No focal deficit present.      Mental Status: He is alert and oriented to person, place, and time.      Cranial Nerves: No cranial nerve deficit.      Sensory: No sensory deficit.      Motor: No weakness.      Gait: Gait normal.   Psychiatric:         Mood and Affect: Mood normal.         Behavior: Behavior normal.         Thought Content: Thought content normal.                 Vitals:    05/15/25 1010   BP: 95/61   Pulse: 92   Resp: 16   Temp: 98.3 °F (36.8 °C)           Body surface area is 1.73 meters squared.    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 P16 negative, PDL1 4%  == PET scan showed no evidence of distant  metastatic disease.   == Recommendation per NCCN guidelines would be for high dose concurrent chemo-XRT. I will send PA for port placement and chemotherapy with weekly Cisplatin 40 mg/m2 to be given with radiation.  ==08/20/24: audiogram ordered for cisplatin, pt had teeth removed and peg has been placed.  Patient here today to discuss upcoming chemotherapy/immunotherapy. An extensive discussion was had which included a thorough discussion of potential side effect profile, risk versus benefits, and expected outcomes.  Risks, including but not limited to, possible hair loss, bone marrow damage, damage to body organs, allergic reactions, sterility, nausea/vomiting, constipation/diarrhea, sores in the mouth, secondary cancers, and rarely death were all discuss.  Specific side effects pertaining to their chemotherapy/immunotherapy medications were discussed as well.  The patient agrees with the plan and all questions and their support systems questions have been answered to their satisfaction.  Premedications were prescribed and patient was educated on appropriate usage.  Patient was educated on when to call, and given the number to call, or to notify the provider including but not limited to:  Persistent nausea and vomiting, dehydration, fever greater than 100.4 lasting over 1 hour induration or isolated feeders greater than 101, rash while on active chemotherapy or immunotherapy, severe pain or new onset pain not controlled by current pain medication regimen.  ==09/09/24: Patient started XRT, did not get labs, drawn today approx 11am, will not be resulted in time to start cisplatin today.  Audiogram reviewed, grossly normal hearing.  Pt is on lactulose, requests refill, will send refill. Educated to hold if diarrhea.  Labs resulted after visit and reviewed, SrCr wnl, 0.84, Magnesium wnl, 1.9. Pt is cleared to start chemotherapy on 9/10/2024.  ==09/19/24: Pt had first chemotherapy on 9/12/24, tolerated well, no nausea.   Trach and peg tube in place functioning well. No complaints, labs reviewed, pt cleared for chemotherapy.  ==10/03/24: Still having some nausea with chemotherapy relieved with ondansetron, no other complaints. Mg 1.6, pt will receive mg in pre and post hydration fluids with cisplatin.  Labs reviewed pt is cleared for chemotherapy.  Visible improvement noted to right neck mass  ==10/10/24: Having some nausea despite ondansetron, will start compazine. Pt complains of fatigue, activity intolerance, continued anemia, iron studies reviewed and are low, pt unable to swallow or tolerate po iron, will send iv iron for approval  ==10/17/24: Pt will complete XRT on 10/25/2024. Coordinated care with XRT and Dr. Barber will order post treatment scans. Pt to receive final cisplatin infusion and start IV iron. Labs reviewed pt cleared for chemotherapy.  == 11/27/24: CT scan for restaging showed significant interval improvement compared to PET/CT dated 8/28/24 with marked decreased size of massive bilateral neck jay disease and also of the very large hypopharyngeal/supraglottic laryngeal primary tumor. Post radiation changes are seen throughout the neck which could limit assessment for any residual disease. Will follow up on future PET scan in 3 months  == 2/18/25: PET scan for restaging showed progressive disease with new onset pulmonary nodules concerning for metastatic disease. Pulmonary/IR biopsy for confirmation. Considering PDL1 +ve, will recommend to start Keytruda, which is category 1 per NCCN  ==02/25/2025:  Patient here today to discuss upcoming chemotherapy/immunotherapy. An extensive discussion was had which included a thorough discussion of potential side effect profile, risk versus benefits, and expected outcomes.  Risks, including but not limited to, possible hair loss, bone marrow damage, damage to body organs, allergic reactions, sterility, nausea/vomiting, constipation/diarrhea, sores in the mouth, secondary  cancers, and rarely death were all discuss.  Specific side effects pertaining to their chemotherapy/immunotherapy medications were discussed as well.  The patient agrees with the plan and all questions and their support systems questions have been answered to their satisfaction.  Premedications were prescribed and patient was educated on appropriate usage.  Patient was educated on when to call, and given the number to call, or to notify the provider including but not limited to:  Persistent nausea and vomiting, dehydration, fever greater than 100.4 lasting over 1 hour induration or isolated feeders greater than 101, rash while on active chemotherapy or immunotherapy, severe pain or new onset pain not controlled by current pain medication regimen.  Per pulm nurse's note patient declined lung nodule biopsy as he can not lay flat feels like it puts pressure on his neck/trach, discussed immunotherapy education with keytruda will start on 3/6/25  ==03/06/2025: Tsh elevated, will repeat with next labs and if elevated start on synthroid, denies complaints, will start keytruda q 3 weeks.  ==03/27/2025: tolerating keyruda well, TSH elevated, will start on synthroid. Labs reviewed, pt is cleared for immunotherapy  ==04/17/2025: Tsh remains elevated but is improved, started on synthroid 3 weeks ago, due for restaging scans will order. Labs reviewed pt is cleared for immunotherapy.  Pt declines restaging scan at this time, reports some fatigue and sluggishness will check iron studies  ==05/15/2025: Tolerating keytruda well, studies reviewed, will send venofer for reauth (was on in 2024) and admin with next cycle of keytruda. Alk phos elevated, instructed to avoid etoh and tylenol, patient states not drinking etoh at this time. If continues to increase or ast/alt increase to 3x uln will consider holding and treat with steroids or send for scan, pt is declining scans at this time due to pain with laying flat    2. H/O  Hepatocellular Carcinoma:    == Reported by patient and on his notes from PCP  == Do not have any prior treatment records at this time. He reports being followed in [place]. Will obtain those treatment records from 2020    3. Hypertension  ==continue to follow up with pcp      4. Cancer cachexia  ==Continues to use % with 5-6 cans of nutritional supplement         Plan:  continue palliative immunotherapy with Keytruda   Rtc 3 weeks cbc cmp tsh  Pet scan for restaging - pt declines     IV iron - send for approval     Total time spent in counseling and discussion about further management options including relevant lab work, treatment,  prognosis, medications and intended side effects was more than 40 minutes. More than 50% of the time was spent on counseling and coordination of care.  I spent a total of 40 minutes on the day of the visit.This includes face to face time and non-face to face time preparing to see the patient (eg, review of tests), Obtaining and/or reviewing separately obtained history, Documenting clinical information in the electronic or other health record, Independently interpreting resultsand communicating results to the patient/family/caregiver, or Care coordination.

## 2025-06-03 ENCOUNTER — OFFICE VISIT (OUTPATIENT)
Dept: PRIMARY CARE CLINIC | Facility: CLINIC | Age: 60
End: 2025-06-03
Payer: MEDICARE

## 2025-06-03 VITALS
DIASTOLIC BLOOD PRESSURE: 68 MMHG | SYSTOLIC BLOOD PRESSURE: 123 MMHG | HEIGHT: 73 IN | BODY MASS INDEX: 17.63 KG/M2 | HEART RATE: 107 BPM | OXYGEN SATURATION: 90 % | WEIGHT: 133 LBS

## 2025-06-03 DIAGNOSIS — J98.4 SMALL AIRWAYS DISEASE: Primary | ICD-10-CM

## 2025-06-03 DIAGNOSIS — K80.61: ICD-10-CM

## 2025-06-03 DIAGNOSIS — Z93.0 TRACHEOSTOMY IN PLACE: ICD-10-CM

## 2025-06-03 DIAGNOSIS — I10 ESSENTIAL (PRIMARY) HYPERTENSION: ICD-10-CM

## 2025-06-03 DIAGNOSIS — K70.40 ALCOHOLIC HEPATIC FAILURE WITHOUT COMA: ICD-10-CM

## 2025-06-03 DIAGNOSIS — G40.909 SEIZURE DISORDER: ICD-10-CM

## 2025-06-03 PROBLEM — J44.9 CHRONIC OBSTRUCTIVE PULMONARY DISEASE, UNSPECIFIED COPD TYPE: Status: ACTIVE | Noted: 2025-06-03

## 2025-06-03 PROBLEM — J44.9 CHRONIC OBSTRUCTIVE PULMONARY DISEASE, UNSPECIFIED COPD TYPE: Status: RESOLVED | Noted: 2025-06-03 | Resolved: 2025-06-03

## 2025-06-03 PROBLEM — K86.1 OTHER CHRONIC PANCREATITIS: Status: RESOLVED | Noted: 2024-12-16 | Resolved: 2025-06-03

## 2025-06-03 PROBLEM — I50.20 HFREF (HEART FAILURE WITH REDUCED EJECTION FRACTION): Status: RESOLVED | Noted: 2025-06-03 | Resolved: 2025-06-03

## 2025-06-03 PROBLEM — I50.20 HFREF (HEART FAILURE WITH REDUCED EJECTION FRACTION): Status: ACTIVE | Noted: 2025-06-03

## 2025-06-03 PROBLEM — I48.0 PAROXYSMAL ATRIAL FIBRILLATION: Status: RESOLVED | Noted: 2025-06-03 | Resolved: 2025-06-03

## 2025-06-03 PROBLEM — I48.0 PAROXYSMAL ATRIAL FIBRILLATION: Status: ACTIVE | Noted: 2025-06-03

## 2025-06-03 PROCEDURE — 3078F DIAST BP <80 MM HG: CPT | Mod: CPTII,,, | Performed by: INTERNAL MEDICINE

## 2025-06-03 PROCEDURE — 99214 OFFICE O/P EST MOD 30 MIN: CPT | Mod: S$PBB,,, | Performed by: INTERNAL MEDICINE

## 2025-06-03 PROCEDURE — 3074F SYST BP LT 130 MM HG: CPT | Mod: CPTII,,, | Performed by: INTERNAL MEDICINE

## 2025-06-03 PROCEDURE — 1159F MED LIST DOCD IN RCRD: CPT | Mod: CPTII,,, | Performed by: INTERNAL MEDICINE

## 2025-06-03 PROCEDURE — 3008F BODY MASS INDEX DOCD: CPT | Mod: CPTII,,, | Performed by: INTERNAL MEDICINE

## 2025-06-03 RX ORDER — ALBUTEROL SULFATE 1.25 MG/3ML
1.25 SOLUTION RESPIRATORY (INHALATION) EVERY 6 HOURS PRN
Qty: 75 ML | Refills: 3 | Status: SHIPPED | OUTPATIENT
Start: 2025-06-03 | End: 2026-06-03

## 2025-06-05 ENCOUNTER — OFFICE VISIT (OUTPATIENT)
Dept: HEMATOLOGY/ONCOLOGY | Facility: CLINIC | Age: 60
End: 2025-06-05
Payer: MEDICARE

## 2025-06-05 VITALS
HEIGHT: 73 IN | TEMPERATURE: 99 F | OXYGEN SATURATION: 86 % | HEART RATE: 101 BPM | DIASTOLIC BLOOD PRESSURE: 57 MMHG | RESPIRATION RATE: 16 BRPM | WEIGHT: 129.19 LBS | SYSTOLIC BLOOD PRESSURE: 105 MMHG | BODY MASS INDEX: 17.12 KG/M2

## 2025-06-05 DIAGNOSIS — C78.02 MALIGNANT NEOPLASM METASTATIC TO BOTH LUNGS: ICD-10-CM

## 2025-06-05 DIAGNOSIS — C78.7 SECONDARY MALIGNANT NEOPLASM OF LIVER AND INTRAHEPATIC BILE DUCT: ICD-10-CM

## 2025-06-05 DIAGNOSIS — C78.01 MALIGNANT NEOPLASM METASTATIC TO BOTH LUNGS: ICD-10-CM

## 2025-06-05 DIAGNOSIS — E03.9 HYPOTHYROIDISM, UNSPECIFIED TYPE: ICD-10-CM

## 2025-06-05 DIAGNOSIS — Z93.1 GASTROSTOMY STATUS: ICD-10-CM

## 2025-06-05 DIAGNOSIS — C13.8 MALIGNANT NEOPLASM OF OVERLAPPING SITES OF HYPOPHARYNX: Primary | ICD-10-CM

## 2025-06-05 PROCEDURE — 3074F SYST BP LT 130 MM HG: CPT | Mod: CPTII,,, | Performed by: NURSE PRACTITIONER

## 2025-06-05 PROCEDURE — G2211 COMPLEX E/M VISIT ADD ON: HCPCS | Mod: ,,, | Performed by: NURSE PRACTITIONER

## 2025-06-05 PROCEDURE — 99215 OFFICE O/P EST HI 40 MIN: CPT | Mod: S$PBB,,, | Performed by: NURSE PRACTITIONER

## 2025-06-05 PROCEDURE — 3078F DIAST BP <80 MM HG: CPT | Mod: CPTII,,, | Performed by: NURSE PRACTITIONER

## 2025-06-05 PROCEDURE — 3008F BODY MASS INDEX DOCD: CPT | Mod: CPTII,,, | Performed by: NURSE PRACTITIONER

## 2025-06-05 PROCEDURE — 1159F MED LIST DOCD IN RCRD: CPT | Mod: CPTII,,, | Performed by: NURSE PRACTITIONER

## 2025-06-05 RX ORDER — DIPHENHYDRAMINE HYDROCHLORIDE 50 MG/ML
50 INJECTION, SOLUTION INTRAMUSCULAR; INTRAVENOUS ONCE AS NEEDED
OUTPATIENT
Start: 2025-06-05

## 2025-06-05 RX ORDER — HEPARIN 100 UNIT/ML
500 SYRINGE INTRAVENOUS
OUTPATIENT
Start: 2025-06-05

## 2025-06-05 RX ORDER — EPINEPHRINE 0.3 MG/.3ML
0.3 INJECTION SUBCUTANEOUS ONCE AS NEEDED
OUTPATIENT
Start: 2025-06-05

## 2025-06-05 RX ORDER — SODIUM CHLORIDE 0.9 % (FLUSH) 0.9 %
10 SYRINGE (ML) INJECTION
OUTPATIENT
Start: 2025-06-05

## 2025-06-09 ENCOUNTER — TELEPHONE (OUTPATIENT)
Dept: HEMATOLOGY/ONCOLOGY | Facility: CLINIC | Age: 60
End: 2025-06-09
Payer: MEDICARE

## 2025-06-09 NOTE — TELEPHONE ENCOUNTER
Copied from CRM #[id number]. Topic: Appointments - Appointment Access  >> Jun 9, 2025  2:49 PM Rupa wrote:  Type:  Patient Returning Call    Who Called:dilshad  Who Left Message for Patient:brijesh  Does the patient know what this is regarding?:appt/6 months  Would the patient rather a call back or a response via [facility]? cb  Best Call Back Number:[phone]  Additional Information: n/a

## 2025-06-26 ENCOUNTER — OFFICE VISIT (OUTPATIENT)
Dept: HEMATOLOGY/ONCOLOGY | Facility: CLINIC | Age: 60
End: 2025-06-26
Payer: MEDICARE

## 2025-06-26 VITALS
HEIGHT: 73 IN | OXYGEN SATURATION: 85 % | TEMPERATURE: 99 F | HEART RATE: 106 BPM | RESPIRATION RATE: 16 BRPM | SYSTOLIC BLOOD PRESSURE: 100 MMHG | BODY MASS INDEX: 17.61 KG/M2 | WEIGHT: 132.88 LBS | DIASTOLIC BLOOD PRESSURE: 60 MMHG

## 2025-06-26 DIAGNOSIS — C78.02 MALIGNANT NEOPLASM METASTATIC TO BOTH LUNGS: Primary | ICD-10-CM

## 2025-06-26 DIAGNOSIS — C78.01 MALIGNANT NEOPLASM METASTATIC TO BOTH LUNGS: Primary | ICD-10-CM

## 2025-06-26 PROCEDURE — 99499 UNLISTED E&M SERVICE: CPT | Mod: S$PBB,,, | Performed by: NURSE PRACTITIONER

## 2025-06-26 NOTE — PROGRESS NOTES
MEDICAL ONCOLOGY FOLLOW UP CONSULTATION NOTE    Patient ID: Wallace D Toussaint is a 59 y.o. male.    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  He has trach in place and has been evaluated recently by radiation oncology for concurrent chemoradiation.  He presents to our clinic today for further evaluation.  He has yet to see the dentist for dental clearance prior to starting radiation treatment.  He is accompanied by his brother Pierce as patient no showed previously on 3 different occasions.         Imaging:     PET scan:   No evidence of distant metastatic disease    Past Medical History:   Diagnosis Date    Alcoholic cirrhosis of liver without ascites 06/04/2024    Last Assessment & Plan:    Formatting of this note might be different from the original.   History & Physical patient reports history of cirrhosis currently appears to be on lactulose.  Complicated by possible hepatocellular carcinoma although patient thinks this was a misdiagnosis.  Currently compensated   -Will resume lactulose when able to swallow      Discharge Summary       Follow-up      Cancer of hypopharynx     Patient was diagnosed in June but not sure of the exact date.    Essential (primary) hypertension 06/04/2024    Last Assessment & Plan:    Formatting of this note might be different from the original.   History & Physical well-controlled on admission at 125/65.   Patient on Entresto and metoprolol which we will hold for now until cleared by speech      Discharge Summary blood pressure was well-controlled despite holding his home medications.  Will continue to hold upon discharge and can follow-up with PCP f    History of subdural hematoma 06/04/2024    Last Assessment &  Plan:    Formatting of this note might be different from the original.   History & Physical per chart review.  Patient did not mention this in history      Discharge Summary       Follow-up      Liver cancer     Patient is not sure of the exact date he was diagnosed with this cancer.    Seizure disorder 2024    Last Assessment & Plan:    Formatting of this note might be different from the original.   History & Physical patient reports no seizure in the past 3 years.  Also does not follow with neurology.  Has been on Keppra prescribed by PCP.   -Will continue IV Keppra for now and transition to p.o. when able      Discharge Summary no seizure activity during the hospital stay.  Keppra resumed upon dischar     Family History   Problem Relation Name Age of Onset    Cancer Mother      Stroke Father      Hypertension Father      Hypertension Brother Patrick Toussaint      Social History     Socioeconomic History    Marital status:     Number of children: 0   Tobacco Use    Smoking status: Former     Current packs/day: 0.00     Average packs/day: 1 pack/day for 40.0 years (40.0 ttl pk-yrs)     Types: Cigarettes     Start date:      Quit date: 2020     Years since quittin.4    Smokeless tobacco: Former   Substance and Sexual Activity    Alcohol use: Not Currently    Drug use: Not Currently     Types: Marijuana     Social Drivers of Health     Food Insecurity: No Food Insecurity (2024)    Received from [facility] Subsidiaries and Affiliates    Hunger Vital Sign     Worried About Running Out of Food in the Last Year: Never true     Ran Out of Food in the Last Year: Never true   Transportation Needs: No Transportation Needs (2024)    Received from [facility]sidi[facility] and [facility]    PRAPARE - Transportation     Lack of Transportation (Medical): No     Lack of Transportation (Non-Medical): No      Past Surgical History:   Procedure Laterality Date    APPENDECTOMY      Patient is not sure of the exact date but this was done when he was a kid.    TRACHEOTOMY  06/2024    Patient is not sure of the exact date but it was done in june 2024         Review of systems:  Review of Systems   Constitutional:  Positive for activity change. Negative for appetite change, chills, diaphoresis, fatigue and unexpected weight change.   HENT:  Positive for trouble swallowing and voice change. Negative for congestion, facial swelling, hearing loss and mouth sores.    Eyes:  Negative for photophobia, pain, discharge and itching.   Respiratory:  Negative for apnea, cough, choking, chest tightness and shortness of breath.    Cardiovascular:  Negative for chest pain, palpitations and leg swelling.   Gastrointestinal:  Negative for abdominal distention, abdominal pain, anal bleeding and blood in stool.   Endocrine: Negative for cold intolerance, heat intolerance, polydipsia and polyphagia.   Genitourinary:  Negative for difficulty urinating, dysuria, flank pain and hematuria.   Musculoskeletal:  Negative for arthralgias, back pain, joint swelling, myalgias, neck pain and neck stiffness.   Skin:  Negative for color change, pallor and wound.   Allergic/Immunologic: Negative for environmental allergies, food allergies and immunocompromised state.   Neurological:  Negative for dizziness, seizures, facial asymmetry, speech difficulty, light-headedness, numbness and headaches.   Hematological:  Negative for adenopathy. Does not bruise/bleed easily.   Psychiatric/Behavioral:  Negative for agitation, behavioral problems, confusion, decreased concentration and sleep disturbance.               Physical Exam  Vitals and nursing note reviewed.   Constitutional:       General: He is not in acute distress.     Appearance: Normal appearance. He is not ill-appearing.   HENT:      Head: Normocephalic and atraumatic.      Nose: No congestion or  rhinorrhea.   Eyes:      General: No scleral icterus.     Extraocular Movements: Extraocular movements intact.      Pupils: Pupils are equal, round, and reactive to light.   Neck:      Comments: +ve for trach in place  Cardiovascular:      Rate and Rhythm: Normal rate and regular rhythm.      Pulses: Normal pulses.      Heart sounds: Normal heart sounds. No murmur heard.     No gallop.   Pulmonary:      Effort: Pulmonary effort is normal. No respiratory distress.      Breath sounds: Normal breath sounds. No stridor. No wheezing or rhonchi.   Abdominal:      General: Bowel sounds are normal. There is no distension.      Palpations: There is no mass.      Tenderness: There is no abdominal tenderness. There is no guarding.   Musculoskeletal:         General: No swelling, tenderness, deformity or signs of injury. Normal range of motion.      Cervical back: Normal range of motion and neck supple. No rigidity. No muscular tenderness.      Right lower leg: No edema.      Left lower leg: No edema.   Skin:     General: Skin is warm.      Coloration: Skin is not jaundiced or pale.      Findings: No bruising or lesion.   Neurological:      General: No focal deficit present.      Mental Status: He is alert and oriented to person, place, and time.      Cranial Nerves: No cranial nerve deficit.      Sensory: No sensory deficit.      Motor: No weakness.      Gait: Gait normal.   Psychiatric:         Mood and Affect: Mood normal.         Behavior: Behavior normal.         Thought Content: Thought content normal.                 There were no vitals filed for this visit.          There is no height or weight on file to calculate BSA.    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 P16 negative, PDL1 4%  == PET scan showed no evidence of distant metastatic disease.   ==  Recommendation per NCCN guidelines would be for high dose concurrent chemo-XRT. I will send PA for port placement and chemotherapy with weekly Cisplatin 40 mg/m2 to be given with radiation.  ==08/20/24: audiogram ordered for cisplatin, pt had teeth removed and peg has been placed.  Patient here today to discuss upcoming chemotherapy/immunotherapy. An extensive discussion was had which included a thorough discussion of potential side effect profile, risk versus benefits, and expected outcomes.  Risks, including but not limited to, possible hair loss, bone marrow damage, damage to body organs, allergic reactions, sterility, nausea/vomiting, constipation/diarrhea, sores in the mouth, secondary cancers, and rarely death were all discuss.  Specific side effects pertaining to their chemotherapy/immunotherapy medications were discussed as well.  The patient agrees with the plan and all questions and their support systems questions have been answered to their satisfaction.  Premedications were prescribed and patient was educated on appropriate usage.  Patient was educated on when to call, and given the number to call, or to notify the provider including but not limited to:  Persistent nausea and vomiting, dehydration, fever greater than 100.4 lasting over 1 hour induration or isolated feeders greater than 101, rash while on active chemotherapy or immunotherapy, severe pain or new onset pain not controlled by current pain medication regimen.  ==09/09/24: Patient started XRT, did not get labs, drawn today approx 11am, will not be resulted in time to start cisplatin today.  Audiogram reviewed, grossly normal hearing.  Pt is on lactulose, requests refill, will send refill. Educated to hold if diarrhea.  Labs resulted after visit and reviewed, SrCr wnl, 0.84, Magnesium wnl, 1.9. Pt is cleared to start chemotherapy on 9/10/2024.  ==09/19/24: Pt had first chemotherapy on 9/12/24, tolerated well, no nausea.  Trach and peg tube in  place functioning well. No complaints, labs reviewed, pt cleared for chemotherapy.  ==10/03/24: Still having some nausea with chemotherapy relieved with ondansetron, no other complaints. Mg 1.6, pt will receive mg in pre and post hydration fluids with cisplatin.  Labs reviewed pt is cleared for chemotherapy.  Visible improvement noted to right neck mass  ==10/10/24: Having some nausea despite ondansetron, will start compazine. Pt complains of fatigue, activity intolerance, continued anemia, iron studies reviewed and are low, pt unable to swallow or tolerate po iron, will send iv iron for approval  ==10/17/24: Pt will complete XRT on 10/25/2024. Coordinated care with XRT and Dr. Barber will order post treatment scans. Pt to receive final cisplatin infusion and start IV iron. Labs reviewed pt cleared for chemotherapy.  == 11/27/24: CT scan for restaging showed significant interval improvement compared to PET/CT dated 8/28/24 with marked decreased size of massive bilateral neck jay disease and also of the very large hypopharyngeal/supraglottic laryngeal primary tumor. Post radiation changes are seen throughout the neck which could limit assessment for any residual disease. Will follow up on future PET scan in 3 months  == 2/18/25: PET scan for restaging showed progressive disease with new onset pulmonary nodules concerning for metastatic disease. Pulmonary/IR biopsy for confirmation. Considering PDL1 +ve, will recommend to start Keytruda, which is category 1 per NCCN  ==02/25/2025:  Patient here today to discuss upcoming chemotherapy/immunotherapy. An extensive discussion was had which included a thorough discussion of potential side effect profile, risk versus benefits, and expected outcomes.  Risks, including but not limited to, possible hair loss, bone marrow damage, damage to body organs, allergic reactions, sterility, nausea/vomiting, constipation/diarrhea, sores in the mouth, secondary cancers, and rarely death  were all discuss.  Specific side effects pertaining to their chemotherapy/immunotherapy medications were discussed as well.  The patient agrees with the plan and all questions and their support systems questions have been answered to their satisfaction.  Premedications were prescribed and patient was educated on appropriate usage.  Patient was educated on when to call, and given the number to call, or to notify the provider including but not limited to:  Persistent nausea and vomiting, dehydration, fever greater than 100.4 lasting over 1 hour induration or isolated feeders greater than 101, rash while on active chemotherapy or immunotherapy, severe pain or new onset pain not controlled by current pain medication regimen.  Per pulm nurse's note patient declined lung nodule biopsy as he can not lay flat feels like it puts pressure on his neck/trach, discussed immunotherapy education with keytruda will start on 3/6/25  ==03/06/2025: Tsh elevated, will repeat with next labs and if elevated start on synthroid, denies complaints, will start keytruda q 3 weeks.  ==03/27/2025: tolerating keyruda well, TSH elevated, will start on synthroid. Labs reviewed, pt is cleared for immunotherapy  ==04/17/2025: Tsh remains elevated but is improved, started on synthroid 3 weeks ago, due for restaging scans will order. Labs reviewed pt is cleared for immunotherapy.  Pt declines restaging scan at this time, reports some fatigue and sluggishness will check iron studies  ==05/15/2025: Tolerating keytruda well, studies reviewed, will send venofer for reauth (was on in 2024) and admin with next cycle of keytruda. Alk phos elevated, instructed to avoid etoh and tylenol, patient states not drinking etoh at this time. If continues to increase or ast/alt increase to 3x uln will consider holding and treat with steroids or send for scan, pt is declining scans at this time due to pain with laying flat  ==06/05/2025: Tolerating keytruda well,  declines scans at this time, due for keytruda and venofer. Labs reviewed, pt is cleared for both  ==06/26/2025: pt with dyspnea and oxygen sat in low 80s sent to er    2. H/O Hepatocellular Carcinoma:    == Reported by patient and on his notes from PCP  == Do not have any prior treatment records at this time. He reports being followed in [place]. Will obtain those treatment records from 2020    3. Hypertension  ==continue to follow up with pcp      4. Cancer cachexia  ==Continues to use % with 5-6 cans of nutritional supplement         Plan:  continue palliative immunotherapy with Keytruda   Rtc 3 weeks cbc cmp tsh  Pet scan for restaging - pt declines         Total time spent in counseling and discussion about further management options including relevant lab work, treatment,  prognosis, medications and intended side effects was more than 40 minutes. More than 50% of the time was spent on counseling and coordination of care.  I spent a total of 40 minutes on the day of the visit.This includes face to face time and non-face to face time preparing to see the patient (eg, review of tests), Obtaining and/or reviewing separately obtained history, Documenting clinical information in the electronic or other health record, Independently interpreting resultsand communicating results to the patient/family/caregiver, or Care coordination.

## 2025-07-01 ENCOUNTER — TELEPHONE (OUTPATIENT)
Dept: HEMATOLOGY/ONCOLOGY | Facility: CLINIC | Age: 60
End: 2025-07-01
Payer: MEDICARE

## 2025-07-02 ENCOUNTER — OUTSIDE PLACE OF SERVICE (OUTPATIENT)
Dept: PULMONOLOGY | Facility: CLINIC | Age: 60
End: 2025-07-02
Payer: MEDICARE

## 2025-07-03 ENCOUNTER — OUTSIDE PLACE OF SERVICE (OUTPATIENT)
Dept: PULMONOLOGY | Facility: CLINIC | Age: 60
End: 2025-07-03
Payer: MEDICARE

## 2025-07-03 ENCOUNTER — TELEPHONE (OUTPATIENT)
Dept: HEMATOLOGY/ONCOLOGY | Facility: CLINIC | Age: 60
End: 2025-07-03
Payer: MEDICARE

## 2025-07-03 NOTE — TELEPHONE ENCOUNTER
Copied from CRM #[id number]. Topic: Appointments - Appointment Access  >> Jul 3, 2025 10:52 AM Aurora wrote:  Type:  Schedule follow up Appointment Request      Name of Caller:ALFREDO Talley/[facility]   He needs a follow up appointment from hospital discharge  Would the patient rather a call back or a response via [facility]? Call back  Best Call Back Number:[phone]  Additional Information: She ask please call    Thanks!

## 2025-07-10 RX ORDER — CIPROFLOXACIN 750 MG/1
750 TABLET, FILM COATED ORAL
COMMUNITY
Start: 2025-07-03 | End: 2025-07-10

## 2025-07-16 ENCOUNTER — OUTSIDE PLACE OF SERVICE (OUTPATIENT)
Dept: PULMONOLOGY | Facility: CLINIC | Age: 60
End: 2025-07-16
Payer: MEDICARE

## 2025-07-17 ENCOUNTER — OUTSIDE PLACE OF SERVICE (OUTPATIENT)
Dept: PULMONOLOGY | Facility: CLINIC | Age: 60
End: 2025-07-17
Payer: MEDICARE

## 2025-07-24 RX ORDER — ALBUTEROL SULFATE 1.25 MG/3ML
1.25 SOLUTION RESPIRATORY (INHALATION) EVERY 6 HOURS PRN
COMMUNITY
Start: 2025-06-03 | End: 2026-06-03

## 2025-07-24 RX ORDER — CIPROFLOXACIN 750 MG/1
750 TABLET, FILM COATED ORAL
COMMUNITY
Start: 2025-07-18 | End: 2025-07-28

## 2025-07-28 ENCOUNTER — OFFICE VISIT (OUTPATIENT)
Dept: PRIMARY CARE CLINIC | Facility: CLINIC | Age: 60
End: 2025-07-28
Payer: MEDICARE

## 2025-07-28 VITALS
DIASTOLIC BLOOD PRESSURE: 61 MMHG | OXYGEN SATURATION: 91 % | SYSTOLIC BLOOD PRESSURE: 111 MMHG | HEART RATE: 102 BPM | HEIGHT: 73 IN | WEIGHT: 119.81 LBS | BODY MASS INDEX: 15.88 KG/M2

## 2025-07-28 DIAGNOSIS — I10 ESSENTIAL (PRIMARY) HYPERTENSION: ICD-10-CM

## 2025-07-28 DIAGNOSIS — E43 SEVERE PROTEIN-CALORIE MALNUTRITION: ICD-10-CM

## 2025-07-28 DIAGNOSIS — Z09 HOSPITAL DISCHARGE FOLLOW-UP: ICD-10-CM

## 2025-07-28 DIAGNOSIS — Z93.0 TRACHEOSTOMY IN PLACE: Primary | ICD-10-CM

## 2025-07-28 DIAGNOSIS — C78.02 MALIGNANT NEOPLASM METASTATIC TO BOTH LUNGS: ICD-10-CM

## 2025-07-28 DIAGNOSIS — C78.01 MALIGNANT NEOPLASM METASTATIC TO BOTH LUNGS: ICD-10-CM

## 2025-07-28 PROCEDURE — G2211 COMPLEX E/M VISIT ADD ON: HCPCS | Mod: ,,, | Performed by: INTERNAL MEDICINE

## 2025-07-28 PROCEDURE — 3074F SYST BP LT 130 MM HG: CPT | Mod: CPTII,,, | Performed by: INTERNAL MEDICINE

## 2025-07-28 PROCEDURE — 3008F BODY MASS INDEX DOCD: CPT | Mod: CPTII,,, | Performed by: INTERNAL MEDICINE

## 2025-07-28 PROCEDURE — 3078F DIAST BP <80 MM HG: CPT | Mod: CPTII,,, | Performed by: INTERNAL MEDICINE

## 2025-07-28 PROCEDURE — 1159F MED LIST DOCD IN RCRD: CPT | Mod: CPTII,,, | Performed by: INTERNAL MEDICINE

## 2025-07-28 PROCEDURE — 99213 OFFICE O/P EST LOW 20 MIN: CPT | Mod: S$PBB,,, | Performed by: INTERNAL MEDICINE

## 2025-07-28 NOTE — PROGRESS NOTES
Subjective:      Patient ID: Wallace D Toussaint is a 60 y.o. male.    Chief Complaint: Hospital Follow Up    HPI    Past Medical History:   Diagnosis Date    Alcoholic cirrhosis of liver without ascites 06/04/2024    Last Assessment & Plan:    Formatting of this note might be different from the original.   History & Physical patient reports history of cirrhosis currently appears to be on lactulose.  Complicated by possible hepatocellular carcinoma although patient thinks this was a misdiagnosis.  Currently compensated   -Will resume lactulose when able to swallow      Discharge Summary       Follow-up      Cancer of hypopharynx     Patient was diagnosed in June but not sure of the exact date.    Essential (primary) hypertension 06/04/2024    Last Assessment & Plan:    Formatting of this note might be different from the original.   History & Physical well-controlled on admission at 125/65.   Patient on Entresto and metoprolol which we will hold for now until cleared by speech      Discharge Summary blood pressure was well-controlled despite holding his home medications.  Will continue to hold upon discharge and can follow-up with PCP f    History of subdural hematoma 06/04/2024    Last Assessment & Plan:    Formatting of this note might be different from the original.   History & Physical per chart review.  Patient did not mention this in history      Discharge Summary       Follow-up      Liver cancer     Patient is not sure of the exact date he was diagnosed with this cancer.    Seizure disorder 06/04/2024    Last Assessment & Plan:    Formatting of this note might be different from the original.   History & Physical patient reports no seizure in the past 3 years.  Also does not follow with neurology.  Has been on Keppra prescribed by PCP.   -Will continue IV Keppra for now and transition to p.o. when able      Discharge Summary no seizure activity during the hospital stay.  Keppra resumed upon dischar        DISCHARGE SUMMARY/HOSPITAL COURSE:    59 y/o M, chronic smoker until 2020 3/2024 with PMHx cirrhosis secondary to chronic alcoholism, hypothyroidism, metastatic laryngeal CA with pulmonary metastasis diagnosed 1/2025 on Keytruda, s/p trach and PEG 5/2024 in [place], cancer cachexia and severe malnutrition, discharge recently about 10 days ago after treatment for aspiration pneumonia and respiratory failure, multiple right-sided pulmonary nodules, lung collapse s/p bronchoscopy 6/30/2025-occlusion mass bronchus intermedius right side- Bx positive for invasive squamous cell CA, s/p palliative radiotherapy to right hilum with a dose of 20 Gray in 5 fractions completed 7/8/2025, and BAL positive for pansensitive Pseudomonas, presenting with worsening short of breath, even when lying down, coughing frequently, no fever or chills, in addition accidentally pulled his PEG tube 7/14/2020 5 AM which was replaced in the ER.On presentation patient hypoxic with SpO2 85% on room air, hypertensive with BP 82/74, tachycardic with heart rate 104, respiratory rate 26, hypotension resolved with IV bolus-peripheral perfusion maintained post IV bolus with brisk capillary refill.Labs significant for mild leukocyte WBC 10.9 Lactate 3.0. CRP 12.3. UA with leukocyte 250. Chest x-ray unchanged left lung base opacities, increased right lung base opacities suspicious for pneumonia or aspiration, redemonstration 4.7 cm right paratracheal mass and 3.7 cm left lung mass. Also elevated troponin 184-likely demand ischemia, EKG sinus rhythm with bifascicular block. BNP 86.7. Respiratory pathogen PCR negativeCTA chest no central pulmonary embolus, right upper lobe mass, bilateral pulmonary nodules or metastasis-stable, bilateral lower lobe atelectasis and consolidation with possible aspiration again noted.Met criteria for sepsis from multilobar pneumonia/postobstructive pneumonia-- likely HCAP with recent hospitalization and  immunocompromised host. ECHO 7/14/25: EF 50%, grade 1 diastolic dysfunction, normal RV systolic pressures, mild mitral regurgitation, trivial aortic regurgitation, mild tricuspid regurgitation.     Admitted for sepsis with acute hypoxic respiratory failure secondary to multilobar pneumonia/postobstructive pneumonia. Pulmonary consulted. Initiated on Vanco and Zosyn. Patient respiratory status subsequently improved. Pulmonary recommended to continue Zosyn and DC vancomycin. Patient was also continued on gentle IV hydration and replaced potassium.  Blood cultures came back negative. Sputum cultures grew Pseudomonas and E. coli. Pulmonary recommended to add ciprofloxacin through PEG [id number] mg twice daily for a total course of 10 days. DC Zosyn.Echocardiogram showed preserved ejection fraction. Elevated troponin likely secondary to demand ischemia from shortness of breath.Home medications Synthroid 50 mcg daily, Keppra 500 mg twice dailyNorco as needed for pain, Zofran 4 mg IV Q6 or as needed for nausea vomiting    Overall patient did very well during this hospital course. Oxygen saturations have improved. Pulmonary recommended 2-week course of oral ciprofloxacin through the PEG tube.     Patient was provided with an oxygen tank    Review of Systems   Constitutional:  Negative for chills and fever.   HENT:  Negative for hearing loss.    Eyes:  Negative for blurred vision.   Respiratory:  Positive for cough and shortness of breath. Negative for wheezing.    Cardiovascular:  Negative for chest pain, palpitations and leg swelling.   Gastrointestinal:  Negative for abdominal pain, blood in stool, constipation, diarrhea, melena, nausea and vomiting.   Genitourinary:  Negative for dysuria, frequency and urgency.   Musculoskeletal:  Negative for falls.   Skin:  Negative for rash.   Neurological:  Negative for dizziness and headaches.   Endo/Heme/Allergies:  Does not bruise/bleed easily.   Psychiatric/Behavioral:  Negative for  "depression. The patient is not nervous/anxious.      Objective:     Physical Exam  Vitals reviewed.   Constitutional:       Appearance: Normal appearance.   HENT:      Head: Normocephalic.      Mouth/Throat:      Mouth: Mucous membranes are moist.      Pharynx: Oropharynx is clear.   Eyes:      Extraocular Movements: Extraocular movements intact.      Conjunctiva/sclera: Conjunctivae normal.      Pupils: Pupils are equal, round, and reactive to light.   Cardiovascular:      Rate and Rhythm: Normal rate and regular rhythm.   Pulmonary:      Effort: Pulmonary effort is normal.      Breath sounds: Normal breath sounds.   Abdominal:      General: Bowel sounds are normal.   Musculoskeletal:      Right lower leg: No edema.      Left lower leg: No edema.   Skin:     General: Skin is warm.      Capillary Refill: Capillary refill takes less than 2 seconds.   Neurological:      Mental Status: He is alert and oriented to person, place, and time.   Psychiatric:         Mood and Affect: Mood normal.       /61 (BP Location: Right arm, Patient Position: Sitting)   Pulse 102   Ht 6' 1" (1.854 m)   Wt 54.3 kg (119 lb 12.8 oz)   SpO2 (!) 91% Comment: without O2 on  BMI 15.81 kg/m²     Assessment:       ICD-10-CM ICD-9-CM   1. Tracheostomy in place  Z93.0 V44.0   2. Essential (primary) hypertension  I10 401.9   3. Severe protein-calorie malnutrition  E43 262   4. Malignant neoplasm metastatic to both lungs  C78.01 197.0    C78.02    5. Hospital discharge follow-up  Z09 V67.59       Plan:     Medication List with Changes/Refills   Current Medications    ALBUTEROL (ACCUNEB) 1.25 MG/3 ML NEBU    Take 3 mLs (1.25 mg total) by nebulization every 6 (six) hours as needed (wheezing).    ALBUTEROL (ACCUNEB) 1.25 MG/3 ML NEBU    Inhale 1.25 mg into the lungs every 6 (six) hours as needed.    FAMOTIDINE (PEPCID) 20 MG TABLET    Take 20 mg by mouth 2 (two) times daily.    LACTULOSE (CHRONULAC) 10 GRAM/15 ML SOLUTION    for 30 Days    " LACTULOSE (CHRONULAC) 20 GRAM/30 ML SOLN    15 mLs (10 g total) by Per G Tube route 2 (two) times daily.    LEVETIRACETAM (KEPPRA) 100 MG/ML SOLN    1,000 mg by FEEDING TUBE route 2 (two) times daily.    LEVOTHYROXINE (SYNTHROID) 50 MCG TABLET    Take 1 tablet (50 mcg total) by mouth once daily.    METOPROLOL TARTRATE (LOPRESSOR) 25 MG TABLET    25 mg by FEEDING TUBE route once daily.    MUPIROCIN (BACTROBAN) 2 % OINTMENT    Apply 2 g topically once daily.    ONDANSETRON (ZOFRAN) 8 MG TABLET    Take 1 tablet (8 mg total) by mouth every 8 (eight) hours as needed for Nausea.    TRAMADOL (ULTRAM) 50 MG TABLET    Take 50 mg by mouth every 6 (six) hours.    TRAMADOL (ULTRAM-ER) 100 MG TB24    Take 50 mg by mouth once daily.        1. Tracheostomy in place    2. Essential (primary) hypertension  Overview:  Last Assessment & Plan:    Formatting of this note might be different from the original.   History & Physical well-controlled on admission at 125/65.   Patient on Entresto and metoprolol which we will hold for now until cleared by speech      Discharge Summary blood pressure was well-controlled despite holding his home medications.  Will continue to hold upon discharge and can follow-up with PCP for further management.      Follow-up    BP stable off antihypertensives.      3. Severe protein-calorie malnutrition  Overview:  Last Assessment & Plan:    Formatting of this note might be different from the original.   History & Physical currently BMI 14.5 associated with recent unintentional weight loss he says over the past few weeks.  Chart review he was noted to have a BMI less than 19 in 10/2023.  Reports that one time he was over 200 pounds.  Albumin 2.6.     -Nutrition consult      Discharge Summary tube feedings initiated as above.      Follow-up    Bolus tube feeds initiated on 6/10.      4. Malignant neoplasm metastatic to both lungs    5. Hospital discharge follow-up             Future Appointments   Date Time  Provider Department [place]   8/6/2025  9:20 AM LAB TESTING, [facility] [address]   8/7/2025 10:20 AM Reva Giordano NP [facility] [address]   1/28/2026 11:00 AM Viviana Cruz MD [facility] PRICG[facility]

## 2025-07-29 ENCOUNTER — TELEPHONE (OUTPATIENT)
Dept: HEMATOLOGY/ONCOLOGY | Facility: CLINIC | Age: 60
End: 2025-07-29
Payer: MEDICARE

## 2025-07-29 NOTE — TELEPHONE ENCOUNTER
Copied from CRM #[id number]. Topic: Appointments - Appointment Access  >> Jul 29, 2025 10:35 AM Rupa wrote:  Type: Appointment Access    Who Called:Kamilla  Does the patient know what this is regarding?:appt  Would the patient rather a call back or a response via [facility]?   Best Call Back Number:[phone]  Additional Information: [phone]

## 2025-07-29 NOTE — TELEPHONE ENCOUNTER
Returned call to patient sister Kamilla. She stated that Mr. Toussaint was out of the hospital and he saw his pcp today and she wanted him to make a follow up appointment to see Reva and start back on his treatments. I spoke to Reva Giordano NP through Teams and she told me to schedule this patient for next week. He is scheduled for labs on Wednesday 8/06/2025 and Office visit w/Reva and txt on 8/07/2025. I sent a message to Infusion through the Infusion/clinic chat.

## 2025-08-11 ENCOUNTER — TELEPHONE (OUTPATIENT)
Dept: HEMATOLOGY/ONCOLOGY | Facility: CLINIC | Age: 60
End: 2025-08-11
Payer: MEDICARE

## 2025-08-21 ENCOUNTER — OFFICE VISIT (OUTPATIENT)
Dept: HEMATOLOGY/ONCOLOGY | Facility: CLINIC | Age: 60
End: 2025-08-21
Payer: MEDICARE

## 2025-08-21 VITALS
RESPIRATION RATE: 16 BRPM | DIASTOLIC BLOOD PRESSURE: 69 MMHG | TEMPERATURE: 98 F | BODY MASS INDEX: 16.09 KG/M2 | WEIGHT: 121.38 LBS | SYSTOLIC BLOOD PRESSURE: 107 MMHG | HEART RATE: 92 BPM | OXYGEN SATURATION: 90 % | HEIGHT: 73 IN

## 2025-08-21 DIAGNOSIS — Z93.0 TRACHEOSTOMY IN PLACE: ICD-10-CM

## 2025-08-21 DIAGNOSIS — C78.02 MALIGNANT NEOPLASM METASTATIC TO BOTH LUNGS: Primary | ICD-10-CM

## 2025-08-21 DIAGNOSIS — C78.01 MALIGNANT NEOPLASM METASTATIC TO BOTH LUNGS: Primary | ICD-10-CM

## 2025-08-21 DIAGNOSIS — E43 SEVERE PROTEIN-CALORIE MALNUTRITION: ICD-10-CM

## 2025-08-21 DIAGNOSIS — C13.8 MALIGNANT NEOPLASM OF OVERLAPPING SITES OF HYPOPHARYNX: ICD-10-CM

## 2025-08-21 PROCEDURE — 3074F SYST BP LT 130 MM HG: CPT | Mod: CPTII,,, | Performed by: NURSE PRACTITIONER

## 2025-08-21 PROCEDURE — 99215 OFFICE O/P EST HI 40 MIN: CPT | Mod: S$PBB,,, | Performed by: NURSE PRACTITIONER

## 2025-08-21 PROCEDURE — G2211 COMPLEX E/M VISIT ADD ON: HCPCS | Mod: ,,, | Performed by: NURSE PRACTITIONER

## 2025-08-21 PROCEDURE — 3008F BODY MASS INDEX DOCD: CPT | Mod: CPTII,,, | Performed by: NURSE PRACTITIONER

## 2025-08-21 PROCEDURE — 3078F DIAST BP <80 MM HG: CPT | Mod: CPTII,,, | Performed by: NURSE PRACTITIONER

## 2025-08-21 PROCEDURE — 1159F MED LIST DOCD IN RCRD: CPT | Mod: CPTII,,, | Performed by: NURSE PRACTITIONER

## 2025-08-21 RX ORDER — SODIUM CHLORIDE 0.9 % (FLUSH) 0.9 %
10 SYRINGE (ML) INJECTION
OUTPATIENT
Start: 2025-08-27

## 2025-08-21 RX ORDER — HEPARIN 100 UNIT/ML
500 SYRINGE INTRAVENOUS
OUTPATIENT
Start: 2025-08-27